# Patient Record
(demographics unavailable — no encounter records)

---

## 2024-10-16 NOTE — REVIEW OF SYSTEMS
[Shortness Of Breath] : shortness of breath [Cough] : cough [Diarrhea: Grade 0] : Diarrhea: Grade 0 [Negative] : Endocrine [FreeTextEntry6] : chronic

## 2024-10-16 NOTE — HISTORY OF PRESENT ILLNESS
[de-identified] : 66 y/o female current smoker ( 2 PPD x 20 yrs, now cut down to 6-7 ciggs / day few yrs) with newly diagnosed small cell lung cancer referred by Dr Kris Shepherd. She  presented with enlarging pulmonary nodules.   CT chest 8/13/24  ( [facility])  showed  enlarging   3.9 x 2.9. 2.6 cm RUL lung mass and new 5x 3.6 x 3.6 cm mass like density in RUL anteromedially  Navigational bronchoscopy on 09/05/2024. Path of RUL cryo biopsy revealed positive for malignant cells. Small cell carcinoma. Path of Level 7, 4R, 11R revealed negative for malignant cells. No epithelial lesion identified.   Chronic mild cough and SOB on exertion- - no significant change. Follows with pulmonologist Dr Katie Jauregui ( [place]- [phone]).  No weigth loss. Overall feels OK- " as usual".  Staging w/up :  PET 10/5/24  FDG avid lobulated right upper lobe mass with necrotic component measures 4.8 x 2.9 cm (image 70) and approximately 7.5 cm in vertical span with photopenic component and thick rind of activity showing SUV 18.3 compatible with recently biopsy-proven malignancy. There are additional FDG avid opacities in the anteromedial aspect of the right upper lobe (image 85) measuring 3.6 x 3.0 cm with up to SUV 9.6. These opacities are also present on the most recent CT chest measuring 4.5 x 2.5 cm and 7.9 cm in vertical span. IMPRESSION : Right upper lobe FDG avid lobulated mass with intense activity compatible with biopsy-proven malignancy. Nonavid 2.0 cm left adrenal nodule. Brain MRI pending  ( MRI was ordered but CT head non contrast done only )

## 2024-10-16 NOTE — ASSESSMENT
[FreeTextEntry1] : 68 y/o female  with COPD and  > 40 pack year smoking history  in  September 2024 diagnosed with small cell lung cancer involving RUL, negative mediastinal node sampling on navigational bronchoscopy. Her performance status is quite good. PET scan did not show distant metastases.  Brain MRI with gadolinium is pending.   Clinically she  has limited  disease small cell lung cancer and  options include surgical resection    followed by adjuvant chemotherapy (  if she is a surgical candidate from thoracic surgery stand point and has adequate pulmonary function/ cleared by pulmonary )  versus definitive chemoRT followed by IO consolidation for non surgical candidates. Although never compared directly in randomized studies- overall survival seems  better in patients treated with surgery and chemotherapy rather than definitive chemoradiation.  I will d/w Dr Jauregui  re : if surgical candidate from pulmonary stand point. If no surgery planned- treatment will be definitive chemoRT followed by IO. Cisplatin 75 mg/m2 D1 ( will split into 35 mg/m2 D1,2 - better tolerated)  Etoposide 100 mg/m2 D1, 2, 3  Every 21-28 days concurrent with RT ( can start chemotherapy first and RT can be aded with cycle 2) Followed by durvalumab [id number] mg q 28 days x one year.    PCP : Dr Glenna Armijo

## 2024-10-16 NOTE — GOALS
[Patient] : patient [Staff: _____] : staff - [unfilled] [FreeTextEntry1] : Gerardo Vaughn [FreeTextEntry2] : spouse [FreeTextEntry3] : [phone] [FreeTextEntry7] : Discussed Health Care Proxy (HCP) with patient and provided patient with HCP form. Patient expressed that her , Gerardo Vaughn, is her HCP and will bring in supporting documentation when she comes in for treatment in a few weeks.

## 2024-11-04 NOTE — REVIEW OF SYSTEMS
[Wheezing] : wheezing [Cough] : cough [SOB on Exertion] : shortness of breath during exertion [Anxiety] : anxiety [Negative] : Allergic/Immunologic

## 2024-11-04 NOTE — REASON FOR VISIT
[Consideration of Curative Therapy] : consideration of curative therapy for [Lung Cancer] : lung cancer [Friend] : friend

## 2024-11-04 NOTE — HISTORY OF PRESENT ILLNESS
[FreeTextEntry1] : The patient is a pleasant 67 year old female diagnosed with limited stage small cell lung cancer referred by Dr. Castrejon. She reports she was sent for a screening chest CT due to her smoking history ( 2 PPD x 20 yrs, now cut down to 2 cigs / day ). She also has COPD and chronic cough.  CT chest 8/13/24 ([facility]) showed enlarging 3.9 x 2.9. 2.6 cm RUL lung mass and new 5x 3.6 x 3.6 cm mass like density in RUL anteromedially.  Navigational bronchoscopy on 09/05/2024. Path of RUL biopsy revealed positive for malignant cells c/w small cell carcinoma. Path of Level 7, 4R, 11R revealed negative for malignant cells. No epithelial lesion identified.  CT brain w/o contrast 10/4/24 showed:  *  No evidence of acute intracranial hemorrhage, midline shift or CT evidence of acute territorial infarct. No area of abnormal enhancement. *  2.3 cm calcified lesion in the scalp at the level of the vertex may represent a sebaceous cyst. Clinical correlation suggested.  PET/CT ([facility]) 10/5/24 FDG avid lobulated right upper lobe mass with necrotic component measures 4.8 x 2.9 cm and approximately 7.5 cm in vertical span with photopenic component and thick rind of activity showing SUV 18.3 compatible with recently biopsy-proven malignancy. There are additional FDG avid opacities in the anteromedial aspect of the right upper lobe (image 85) measuring 3.6 x 3.0 cm with up to SUV 9.6. These opacities are also present on the most recent CT chest measuring 4.5 x 2.5 cm and 7.9 cm in vertical span. IMPRESSION : Right upper lobe FDG avid lobulated mass with intense activity compatible with biopsy-proven malignancy. Nonavid 2.0 cm left adrenal nodule.  Brain MRI 10/4/24 No evidence of metastases. No evidence of acute infarct, or hemorrhage.   There is no evidence of abnormal enhancement. Scattered T2/FLAIR hyperintense signal in the periventricular and pontine white matter, suggestive of mild to moderate chronic microvascular ischemic change.  She lives in  [place] with her . She has chronic mild cough and SOB on exertion- due to COPD, no significant change. Follows with pulmonologist Dr Katie Jauregui.  PFTs on 4/16/24 showed FEV 1 0.78 L and DLCO 46 % predicted. She does not use home O2 but was not cleared for surgery.  She began cycle 1 of chemotherapy last week with Cisplatin/Etopside on 11/1/24 and presents with her friend to discuss the role of radiation therapy in her care.

## 2024-11-04 NOTE — HISTORY OF PRESENT ILLNESS
[FreeTextEntry1] : The patient is a pleasant 67 year old female diagnosed with limited stage small cell lung cancer referred by Dr. Castrejon. She reports she was sent for a screening chest CT due to her smoking history ( 2 PPD x 20 yrs, now cut down to 2 cigs / day ). She also has COPD and chronic cough.  CT chest 8/13/24 ([facility]) showed enlarging 3.9 x 2.9. 2.6 cm RUL lung mass and new 5x 3.6 x 3.6 cm mass like density in RUL anteromedially.  Navigational bronchoscopy on 09/05/2024. Path of RUL biopsy revealed positive for malignant cells c/w small cell carcinoma. Path of Level 7, 4R, 11R revealed negative for malignant cells. No epithelial lesion identified.  CT brain w/o contrast 10/4/24 showed:  *  No evidence of acute intracranial hemorrhage, midline shift or CT evidence of acute territorial infarct. No area of abnormal enhancement. *  2.3 cm calcified lesion in the scalp at the level of the vertex may represent a sebaceous cyst. Clinical correlation suggested.  PET/CT ([facility]) 10/5/24 FDG avid lobulated right upper lobe mass with necrotic component measures 4.8 x 2.9 cm and approximately 7.5 cm in vertical span with photopenic component and thick rind of activity showing SUV 18.3 compatible with recently biopsy-proven malignancy. There are additional FDG avid opacities in the anteromedial aspect of the right upper lobe (image 85) measuring 3.6 x 3.0 cm with up to SUV 9.6. These opacities are also present on the most recent CT chest measuring 4.5 x 2.5 cm and 7.9 cm in vertical span. IMPRESSION : Right upper lobe FDG avid lobulated mass with intense activity compatible with biopsy-proven malignancy. Nonavid 2.0 cm left adrenal nodule.  Brain MRI 10/4/24 No evidence of metastases. No evidence of acute infarct, or hemorrhage.   There is no evidence of abnormal enhancement. Scattered T2/FLAIR hyperintense signal in the periventricular and pontine white matter, suggestive of mild to moderate chronic microvascular ischemic change.  She lives in  [place] with her . She has chronic mild cough and SOB on exertion- due to COPD, no significant change. Follows with pulmonologist Dr Katie Jauregui.  PFTs on 4/16/24 showed FEV 1 0.78 L and DLCO 46 % predicted. She does not use home O2 but was not cleared for surgery.  She began cycle 1 of chemotherapy last week with Cisplatin/Etopside on 11/1/24 and presents with her friend to discuss the role of radiation therapy in her care.

## 2024-11-04 NOTE — VITALS
[Maximal Pain Intensity: 0/10] : 0/10 [Date: ____________] : Patient's last distress assessment performed on [unfilled]. [2 - Distress Level] : Distress Level: 2 [Patient given social work contact information and resource sheet] : Patient was given social work contact information and resource sheet [80: Normal activity with effort; some signs or symptoms of disease.] : 80: Normal activity with effort; some signs or symptoms of disease.

## 2024-11-04 NOTE — DISEASE MANAGEMENT
[Clinical] : TNM Stage: c [II] : II [FreeTextEntry4] : limited stage [TTNM] : 2 [NTNM] : 0 [MTNM] : 0

## 2024-11-04 NOTE — PHYSICAL EXAM
[Outer Ear] : the ears and nose were normal in appearance [Both Tympanic Membranes Were Examined] : both tympanic membranes were normal [] : no respiratory distress [Heart Rate And Rhythm] : heart rate and rhythm were normal [Heart Sounds] : normal S1 and S2 [Cervical Lymph Nodes Enlarged Posterior Bilaterally] : posterior cervical [Supraclavicular Lymph Nodes Enlarged Bilaterally] : supraclavicular [No Focal Deficits] : no focal deficits [Normal] : oriented to person, place and time, the affect was normal, the mood was normal and not anxious [Oriented To Time, Place, And Person] : oriented to person, place, and time

## 2024-11-13 NOTE — ASSESSMENT
[FreeTextEntry1] : Patient is a 67 y.o. with COPD, with limited stage small cell lung cancer RUL, negative mediastinal node sampling on navigational bronchoscopy. Not a surgical candidate due to poor PFT's, else her performance status is quite good. 11/1/24 - Started on chemotherapy with CDDP/Etoposide.  Tolerated 1st cycle very well.  Has since been seen by Rad/onc - plan to start RT with C#2 - to go over ~ 6 1/2 weeks.    Start date set for 11/18/24.  To be followed by durvalumab [id number] mg q28 days x one year.   C#2 due 11/20/24.  PE 12/4/24.  C#3 due 12/11/24.  Will need weekly CBC's at this time.  C#4 due 1/1/25 - since Due on New Year's Day will need to clarify how to give for this week.  Will book additional appointments.   Dr. Glenna Armijo (PCP) Dr. Katie Jauregui (Pulm - [place]- [phone]) Dr. Yoselyn Taylor (Rad/Onc)

## 2024-11-13 NOTE — HISTORY OF PRESENT ILLNESS
[Disease: _____________________] : Disease: [unfilled] [T: ___] : T[unfilled] [AJCC Stage: ____] : AJCC Stage: [unfilled] [de-identified] : WILBER SORTO is a 67 y.o. F, current smoker (hx 2 PPD x 20 yrs, now cut down to 6-7 cigs/day few yrs), with a PMH significant for HTN and COPD, who we are following for a small cell lung cancer.  She presented with enlarging pulmonary nodules. Chronic mild cough and SOB on exertion - no significant change. Follows with pulmonologist Dr Katie Jauregui ( [place]- [phone]).  No weight loss. Overall feels OK - "as usual".  8/13/24 - CT Chest ([facility]) - Enlarging 3.9 x 2.9. 2.6 cm RUL lung mass and new 5 x 3.6 x 3.6 cm mass like density in RUL anteromedially. 9/5/24 - Navigational bronchoscopy - Path: RUL Cryo biopsy and RUL BAL positive for malignant cells - Small cell carcinoma. Path of Level 7, 4R, 11R negative for malignant cells. No epithelial lesion identified.  10/5/24 - PET/CT - 4.8 x 2.9 x approximately 7.5 cm in vertical span RUL mass with necrotic component, SUV 18.3, c/w recent biopsy-proven malignancy. There are additional FDG avid opacities in the anteromedial aspect of the RUL measuring 3.6 x 3.0 cm with up to SUV 9.6. These opacities are also present on the most recent CT chest measuring 4.5 x 2.5 cm and 7.9 cm in vertical span.  Nonavid 2.0 cm left adrenal nodule. 10/14/24 - Brain MRI - No evidence of metastases. No evidence of acute infarct, or hemorrhage.   PFTs on 4/16/24 showed FEV 1 0.78 L and DLCO 46 % predicted. She does not use home O2 but was not cleared for surgery.  11/1/24 - Started CDDP/Etoposide.   [de-identified] : Small cell [de-identified] : PD-L1 0% [de-identified] : Patient D15 C1 CDDP/Etoposide Saw RT - candidate for definitive chemoradiation. Had sim on 11/5/24.  Scheduled to start on 11/18/24.  States did very well with 1st chemo - had no N/V - did not need to take any nausea pills.   Had some constipation - took Miralax x 2.  Denies significant fatigue.  Denies any pains.  Main issue has been stress as she is having issues with her  - she feels he is not supportive.  Still smoking a few cigarettes a day.  Denies any changes in her family, medical, or social history since her last visit of 10/8/24.

## 2024-11-13 NOTE — REASON FOR VISIT
[Follow-Up Visit] : a follow-up [FreeTextEntry2] : Locally advanced small cell lung cancer - D15 C1 CDDP/Etoposide

## 2024-11-13 NOTE — PHYSICAL EXAM
[Normal] : full range of motion and no deformities appreciated [de-identified] : looks well  [de-identified] : anicteric [de-identified] : no c/c/e [de-identified] : no rashes

## 2024-11-13 NOTE — ASSESSMENT
[FreeTextEntry1] : Patient is a 67 y.o. with COPD, with limited stage small cell lung cancer RUL, negative mediastinal node sampling on navigational bronchoscopy. Not a surgical candidate due to poor PFT's, else her performance status is quite good. 11/1/24 - Started on chemotherapy with CDDP/Etoposide.  Tolerated 1st cycle very well.  Has since been seen by Rad/onc - plan to start RT with C#2 - to go over ~ 6 1/2 weeks.    Start date set for 11/18/24.  To be followed by durvalumab [id number] mg q28 days x one year.   C#2 due 11/20/24.  PE 12/4/24.  C#3 due 12/11/24.  Will need weekly CBC's at this time.  C#4 due 1/1/25 - since Due on New Year's Day will need to clarify how to give for this week.  Will book additional appointments.   Dr. Glenna Armijo (PCP) Dr. Katie Jauregui (Pulm - [place]- [phone]) Dr. Yoselyn Taylor (Rad/Onc)

## 2024-11-13 NOTE — PHYSICAL EXAM
[Normal] : full range of motion and no deformities appreciated [de-identified] : looks well  [de-identified] : anicteric [de-identified] : no c/c/e [de-identified] : no rashes

## 2024-11-13 NOTE — REVIEW OF SYSTEMS
[Shortness Of Breath] : shortness of breath [Cough] : cough [Diarrhea: Grade 0] : Diarrhea: Grade 0 [Patient Intake Form Reviewed] : Patient intake form was reviewed [Constipation] : constipation [Negative] : Allergic/Immunologic [FreeTextEntry6] : chronic

## 2024-11-13 NOTE — HISTORY OF PRESENT ILLNESS
[Disease: _____________________] : Disease: [unfilled] [T: ___] : T[unfilled] [AJCC Stage: ____] : AJCC Stage: [unfilled] [de-identified] : WILBER SORTO is a 67 y.o. F, current smoker (hx 2 PPD x 20 yrs, now cut down to 6-7 cigs/day few yrs), with a PMH significant for HTN and COPD, who we are following for a small cell lung cancer.  She presented with enlarging pulmonary nodules. Chronic mild cough and SOB on exertion - no significant change. Follows with pulmonologist Dr Katie Jauregui ( [place]- [phone]).  No weight loss. Overall feels OK - "as usual".  8/13/24 - CT Chest ([facility]) - Enlarging 3.9 x 2.9. 2.6 cm RUL lung mass and new 5 x 3.6 x 3.6 cm mass like density in RUL anteromedially. 9/5/24 - Navigational bronchoscopy - Path: RUL Cryo biopsy and RUL BAL positive for malignant cells - Small cell carcinoma. Path of Level 7, 4R, 11R negative for malignant cells. No epithelial lesion identified.  10/5/24 - PET/CT - 4.8 x 2.9 x approximately 7.5 cm in vertical span RUL mass with necrotic component, SUV 18.3, c/w recent biopsy-proven malignancy. There are additional FDG avid opacities in the anteromedial aspect of the RUL measuring 3.6 x 3.0 cm with up to SUV 9.6. These opacities are also present on the most recent CT chest measuring 4.5 x 2.5 cm and 7.9 cm in vertical span.  Nonavid 2.0 cm left adrenal nodule. 10/14/24 - Brain MRI - No evidence of metastases. No evidence of acute infarct, or hemorrhage.   PFTs on 4/16/24 showed FEV 1 0.78 L and DLCO 46 % predicted. She does not use home O2 but was not cleared for surgery.  11/1/24 - Started CDDP/Etoposide.   [de-identified] : Small cell [de-identified] : PD-L1 0% [de-identified] : Patient D15 C1 CDDP/Etoposide Saw RT - candidate for definitive chemoradiation. Had sim on 11/5/24.  Scheduled to start on 11/18/24.  States did very well with 1st chemo - had no N/V - did not need to take any nausea pills.   Had some constipation - took Miralax x 2.  Denies significant fatigue.  Denies any pains.  Main issue has been stress as she is having issues with her  - she feels he is not supportive.  Still smoking a few cigarettes a day.  Denies any changes in her family, medical, or social history since her last visit of 10/8/24.

## 2024-11-19 NOTE — DISEASE MANAGEMENT
[Clinical] : TNM Stage: c [II] : II [FreeTextEntry4] : limited stage [NTNM] : 0 [TTNM] : 2 [MTNM] : 0 [de-identified] : 400 [de-identified] : 7099 [de-identified] : Right lung

## 2024-11-19 NOTE — DISEASE MANAGEMENT
[Clinical] : TNM Stage: c [II] : II [FreeTextEntry4] : limited stage [TTNM] : 2 [NTNM] : 0 [MTNM] : 0 [de-identified] : 400 [de-identified] : 0229 [de-identified] : Right lung

## 2024-11-19 NOTE — HISTORY OF PRESENT ILLNESS
[FreeTextEntry1] : The patient is a pleasant 68 year old female diagnosed with limited stage small cell lung cancer referred by Dr. Castrejon. She reports she was sent for a screening chest CT due to her smoking history ( 2 PPD x 20 yrs, now cut down to 2 cigs / day ). She also has COPD and chronic cough.  CT chest 8/13/24 ([facility]) showed enlarging 3.9 x 2.9. 2.6 cm RUL lung mass and new 5x 3.6 x 3.6 cm mass like density in RUL anteromedially.  Navigational bronchoscopy on 09/05/2024. Path of RUL biopsy revealed positive for malignant cells c/w small cell carcinoma. Path of Level 7, 4R, 11R revealed negative for malignant cells. No epithelial lesion identified.  CT brain w/o contrast 10/4/24 showed:  *  No evidence of acute intracranial hemorrhage, midline shift or CT evidence of acute territorial infarct. No area of abnormal enhancement. *  2.3 cm calcified lesion in the scalp at the level of the vertex may represent a sebaceous cyst. Clinical correlation suggested.  PET/CT ([facility]) 10/5/24 FDG avid lobulated right upper lobe mass with necrotic component measures 4.8 x 2.9 cm and approximately 7.5 cm in vertical span with photopenic component and thick rind of activity showing SUV 18.3 compatible with recently biopsy-proven malignancy. There are additional FDG avid opacities in the anteromedial aspect of the right upper lobe (image 85) measuring 3.6 x 3.0 cm with up to SUV 9.6. These opacities are also present on the most recent CT chest measuring 4.5 x 2.5 cm and 7.9 cm in vertical span. IMPRESSION : Right upper lobe FDG avid lobulated mass with intense activity compatible with biopsy-proven malignancy. Nonavid 2.0 cm left adrenal nodule.  Brain MRI 10/4/24 No evidence of metastases. No evidence of acute infarct, or hemorrhage.   There is no evidence of abnormal enhancement. Scattered T2/FLAIR hyperintense signal in the periventricular and pontine white matter, suggestive of mild to moderate chronic microvascular ischemic change.  She lives in  [place] with her . She has chronic mild cough and SOB on exertion- due to COPD, no significant change. Follows with pulmonologist Dr Katie Jauregui.  PFTs on 4/16/24 showed FEV 1 0.78 L and DLCO 46 % predicted. She does not use home O2 but was not cleared for surgery.  She began cycle 1 of chemotherapy last week with Cisplatin/Etopside on 11/1/24 and presents with her friend to discuss the role of radiation therapy in her care.  11/19/24 Patient seen for OTV fx 2/33. Feels well. 2nd cycle chemo this week.

## 2024-11-26 NOTE — DISEASE MANAGEMENT
[Clinical] : TNM Stage: c [II] : II [FreeTextEntry4] : limited stage [TTNM] : 2 [NTNM] : 0 [MTNM] : 0 [de-identified] : 3401 [de-identified] : 0303 [de-identified] : Right lung

## 2024-11-26 NOTE — REASON FOR VISIT
[Routine On-Treatment] : a routine on-treatment visit for [Lung Cancer] : lung cancer [Friend] : friend

## 2024-11-26 NOTE — HISTORY OF PRESENT ILLNESS
[FreeTextEntry1] : The patient is a pleasant 68 year old female diagnosed with limited stage small cell lung cancer referred by Dr. Castrejon. She reports she was sent for a screening chest CT due to her smoking history ( 2 PPD x 20 yrs, now cut down to 2 cigs / day ). She also has COPD and chronic cough.  CT chest 8/13/24 ([facility]) showed enlarging 3.9 x 2.9. 2.6 cm RUL lung mass and new 5x 3.6 x 3.6 cm mass like density in RUL anteromedially.  Navigational bronchoscopy on 09/05/2024. Path of RUL biopsy revealed positive for malignant cells c/w small cell carcinoma. Path of Level 7, 4R, 11R revealed negative for malignant cells. No epithelial lesion identified.  CT brain w/o contrast 10/4/24 showed:  *  No evidence of acute intracranial hemorrhage, midline shift or CT evidence of acute territorial infarct. No area of abnormal enhancement. *  2.3 cm calcified lesion in the scalp at the level of the vertex may represent a sebaceous cyst. Clinical correlation suggested.  PET/CT ([facility]) 10/5/24 FDG avid lobulated right upper lobe mass with necrotic component measures 4.8 x 2.9 cm and approximately 7.5 cm in vertical span with photopenic component and thick rind of activity showing SUV 18.3 compatible with recently biopsy-proven malignancy. There are additional FDG avid opacities in the anteromedial aspect of the right upper lobe (image 85) measuring 3.6 x 3.0 cm with up to SUV 9.6. These opacities are also present on the most recent CT chest measuring 4.5 x 2.5 cm and 7.9 cm in vertical span. IMPRESSION : Right upper lobe FDG avid lobulated mass with intense activity compatible with biopsy-proven malignancy. Nonavid 2.0 cm left adrenal nodule.  Brain MRI 10/4/24 No evidence of metastases. No evidence of acute infarct, or hemorrhage.   There is no evidence of abnormal enhancement. Scattered T2/FLAIR hyperintense signal in the periventricular and pontine white matter, suggestive of mild to moderate chronic microvascular ischemic change.  She lives in  [place] with her . She has chronic mild cough and SOB on exertion- due to COPD, no significant change. Follows with pulmonologist Dr Katie Jauregui.  PFTs on 4/16/24 showed FEV 1 0.78 L and DLCO 46 % predicted. She does not use home O2 but was not cleared for surgery.  She began cycle 1 of chemotherapy last week with Cisplatin/Etopside on 11/1/24 and presents with her friend to discuss the role of radiation therapy in her care.  11/19/24 Patient seen for OTV fx 2/33. Feels well. 2nd cycle chemo this week.  11/26/24. Patient presents for OTV fx 8/33. Feels well. No fatigue. Upset about alopecia.

## 2024-12-04 NOTE — DISEASE MANAGEMENT
[Clinical] : TNM Stage: c [II] : II [FreeTextEntry4] : limited stage [TTNM] : 2 [NTNM] : 0 [MTNM] : 0 [de-identified] : 4695 [de-identified] : 2133 [de-identified] : Right lung

## 2024-12-04 NOTE — HISTORY OF PRESENT ILLNESS
[FreeTextEntry1] : The patient is a pleasant 68 year old female diagnosed with limited stage small cell lung cancer referred by Dr. Castrejon. She reports she was sent for a screening chest CT due to her smoking history ( 2 PPD x 20 yrs, now cut down to 2 cigs / day ). She also has COPD and chronic cough.  CT chest 8/13/24 ([facility]) showed enlarging 3.9 x 2.9. 2.6 cm RUL lung mass and new 5x 3.6 x 3.6 cm mass like density in RUL anteromedially.  Navigational bronchoscopy on 09/05/2024. Path of RUL biopsy revealed positive for malignant cells c/w small cell carcinoma. Path of Level 7, 4R, 11R revealed negative for malignant cells. No epithelial lesion identified.  CT brain w/o contrast 10/4/24 showed:  *  No evidence of acute intracranial hemorrhage, midline shift or CT evidence of acute territorial infarct. No area of abnormal enhancement. *  2.3 cm calcified lesion in the scalp at the level of the vertex may represent a sebaceous cyst. Clinical correlation suggested.  PET/CT ([facility]) 10/5/24 FDG avid lobulated right upper lobe mass with necrotic component measures 4.8 x 2.9 cm and approximately 7.5 cm in vertical span with photopenic component and thick rind of activity showing SUV 18.3 compatible with recently biopsy-proven malignancy. There are additional FDG avid opacities in the anteromedial aspect of the right upper lobe (image 85) measuring 3.6 x 3.0 cm with up to SUV 9.6. These opacities are also present on the most recent CT chest measuring 4.5 x 2.5 cm and 7.9 cm in vertical span. IMPRESSION : Right upper lobe FDG avid lobulated mass with intense activity compatible with biopsy-proven malignancy. Nonavid 2.0 cm left adrenal nodule.  Brain MRI 10/4/24 No evidence of metastases. No evidence of acute infarct, or hemorrhage.   There is no evidence of abnormal enhancement. Scattered T2/FLAIR hyperintense signal in the periventricular and pontine white matter, suggestive of mild to moderate chronic microvascular ischemic change.  She lives in  [place] with her . She has chronic mild cough and SOB on exertion- due to COPD, no significant change. Follows with pulmonologist Dr Katie Jauregui.  PFTs on 4/16/24 showed FEV 1 0.78 L and DLCO 46 % predicted. She does not use home O2 but was not cleared for surgery.  She began cycle 1 of chemotherapy last week with Cisplatin/Etopside on 11/1/24 and presents with her friend to discuss the role of radiation therapy in her care.  11/19/24 Patient seen for OTV fx 2/33. Feels well. 2nd cycle chemo this week.  11/26/24. Patient presents for OTV fx 8/33. Feels well. No fatigue. Upset about alopecia.   12/4/24 Patient seen for OTV, fx 11/33. Fatigue and bothered by alopecia.

## 2024-12-10 NOTE — HISTORY OF PRESENT ILLNESS
[FreeTextEntry1] : The patient is a pleasant 68 year old female diagnosed with limited stage small cell lung cancer referred by Dr. Castrejon. She reports she was sent for a screening chest CT due to her smoking history ( 2 PPD x 20 yrs, now cut down to 2 cigs / day ). She also has COPD and chronic cough.  CT chest 8/13/24 ([facility]) showed enlarging 3.9 x 2.9. 2.6 cm RUL lung mass and new 5x 3.6 x 3.6 cm mass like density in RUL anteromedially.  Navigational bronchoscopy on 09/05/2024. Path of RUL biopsy revealed positive for malignant cells c/w small cell carcinoma. Path of Level 7, 4R, 11R revealed negative for malignant cells. No epithelial lesion identified.  CT brain w/o contrast 10/4/24 showed:  *  No evidence of acute intracranial hemorrhage, midline shift or CT evidence of acute territorial infarct. No area of abnormal enhancement. *  2.3 cm calcified lesion in the scalp at the level of the vertex may represent a sebaceous cyst. Clinical correlation suggested.  PET/CT ([facility]) 10/5/24 FDG avid lobulated right upper lobe mass with necrotic component measures 4.8 x 2.9 cm and approximately 7.5 cm in vertical span with photopenic component and thick rind of activity showing SUV 18.3 compatible with recently biopsy-proven malignancy. There are additional FDG avid opacities in the anteromedial aspect of the right upper lobe (image 85) measuring 3.6 x 3.0 cm with up to SUV 9.6. These opacities are also present on the most recent CT chest measuring 4.5 x 2.5 cm and 7.9 cm in vertical span. IMPRESSION : Right upper lobe FDG avid lobulated mass with intense activity compatible with biopsy-proven malignancy. Nonavid 2.0 cm left adrenal nodule.  Brain MRI 10/4/24 No evidence of metastases. No evidence of acute infarct, or hemorrhage.   There is no evidence of abnormal enhancement. Scattered T2/FLAIR hyperintense signal in the periventricular and pontine white matter, suggestive of mild to moderate chronic microvascular ischemic change.  She lives in  [place] with her . She has chronic mild cough and SOB on exertion- due to COPD, no significant change. Follows with pulmonologist Dr Katie Jauregui.  PFTs on 4/16/24 showed FEV 1 0.78 L and DLCO 46 % predicted. She does not use home O2 but was not cleared for surgery.  She began cycle 1 of chemotherapy last week with Cisplatin/Etopside on 11/1/24 and presents with her friend to discuss the role of radiation therapy in her care.  11/19/24 Patient seen for OTV fx 2/33. Feels well. 2nd cycle chemo this week.  11/26/24. Patient presents for OTV fx 8/33. Feels well. No fatigue. Upset about alopecia.   12/4/24 Patient seen for OTV, fx 11/33. Fatigue and bothered by alopecia.  12/10/24 Patient seen for OTV, fx 15/33. Feels well. NO complaints.

## 2024-12-10 NOTE — DISEASE MANAGEMENT
[Clinical] : TNM Stage: c [II] : II [FreeTextEntry4] : limited stage [TTNM] : 2 [NTNM] : 0 [MTNM] : 0 [de-identified] : 2100 [de-identified] : 8304 [de-identified] : Right lung

## 2024-12-13 NOTE — REASON FOR VISIT
[Follow-Up Visit] : a follow-up [Friend] : friend [FreeTextEntry2] : Locally advanced small cell lung cancer - D15 C2 CDDP/Etoposide

## 2024-12-13 NOTE — REVIEW OF SYSTEMS
[Patient Intake Form Reviewed] : Patient intake form was reviewed [Shortness Of Breath] : shortness of breath [Cough] : cough [Constipation] : constipation [Negative] : Allergic/Immunologic [FreeTextEntry6] : chronic

## 2024-12-13 NOTE — HISTORY OF PRESENT ILLNESS
[Disease: _____________________] : Disease: [unfilled] [T: ___] : T[unfilled] [AJCC Stage: ____] : AJCC Stage: [unfilled] [de-identified] : WILBER SORTO is a 68 y.o. F, current smoker (hx 2 PPD x 20 yrs, now cut down to 6-7 cigs/day few yrs), with a PMH significant for HTN and COPD, who we are following for a small cell lung cancer.  She presented with enlarging pulmonary nodules. Chronic mild cough and SOB on exertion - no significant change. Follows with pulmonologist Dr Katie Jauregui ( [place]- [phone]).  No weight loss. Overall feels OK - "as usual".  8/13/24 - CT Chest ([facility]) - Enlarging 3.9 x 2.9. 2.6 cm RUL lung mass and new 5 x 3.6 x 3.6 cm mass like density in RUL anteromedially. 9/5/24 - Navigational bronchoscopy - Path: RUL Cryo biopsy and RUL BAL positive for malignant cells - Small cell carcinoma. Path of Level 7, 4R, 11R negative for malignant cells. No epithelial lesion identified.  10/5/24 - PET/CT - 4.8 x 2.9 x approximately 7.5 cm in vertical span RUL mass with necrotic component, SUV 18.3, c/w recent biopsy-proven malignancy. There are additional FDG avid opacities in the anteromedial aspect of the RUL measuring 3.6 x 3.0 cm with up to SUV 9.6. These opacities are also present on the most recent CT chest measuring 4.5 x 2.5 cm and 7.9 cm in vertical span.  Nonavid 2.0 cm left adrenal nodule. 10/14/24 - Brain MRI - No evidence of metastases. No evidence of acute infarct, or hemorrhage.   PFTs on 4/16/24 showed FEV 1 0.78 L and DLCO 46 % predicted. She does not use home O2 but was not cleared for surgery.  11/1/24 - Started CDDP/Etoposide. 11/18/24 - Started XRT prior to C#2.  [de-identified] : Small cell [de-identified] : PD-L1 0% [de-identified] : Patient D15 C2 CDDP/Etoposide 11/18/24 - Started RT.  Very anxious today - ready to walk out stating "This is my 3rd doctor appointment today and I just want to go home." Patient states she is tolerating RT and chemo well.  Nothing new since last visit.   Denies any changes in her family, medical, or social history since her last visit of 11/13/24.

## 2024-12-13 NOTE — ASSESSMENT
[FreeTextEntry1] : Patient is a 68 y.o. with COPD, with limited stage small cell lung cancer RUL, negative mediastinal node sampling on navigational bronchoscopy. Not a surgical candidate due to poor PFT's, else her performance status is quite good.  11/1/24 - Started on chemotherapy with CDDP/Etoposide.  11/18/24 - Started XRT prior to C#2 - to go over ~ 6 1/2 weeks.    Continues to tolerate chemo and RT very well.   Plan -  C#3 due 12/11/24.  Will need weekly CBC's at this time - sched 12/18/24.  Has PE 12/23/24 at 5:00.  Patient's RT appt is earlier in the day.  She was instructed to come for labs after and we can then make this appointment a telehealth visit.   C#4 due 1/1/25 - since Due on New Year's Day will give 12/31/24, then 1/2/25 and 1/3/25.  After 4 cycles plan is to start on durvalumab [id number] mg q28 days x one year.   Dr. Glenna Armijo (PCP) Dr. Katie Jauregui (Pulm - [place]- [phone]) Dr. Yoselyn Taylor (Rad/Onc)

## 2024-12-13 NOTE — PHYSICAL EXAM
[Normal] : affect appropriate [de-identified] : looks well  [de-identified] : anicteric [de-identified] : no c/c/e [de-identified] : no rashes

## 2024-12-17 NOTE — DISEASE MANAGEMENT
[Clinical] : TNM Stage: c [II] : II [FreeTextEntry4] : limited stage [TTNM] : 2 [NTNM] : 0 [MTNM] : 0 [de-identified] : 3202 [de-identified] : 3571 [de-identified] : Right lung

## 2024-12-17 NOTE — HISTORY OF PRESENT ILLNESS
[FreeTextEntry1] : The patient is a pleasant 68 year old female diagnosed with limited stage small cell lung cancer referred by Dr. Castrejon. She reports she was sent for a screening chest CT due to her smoking history ( 2 PPD x 20 yrs, now cut down to 2 cigs / day ). She also has COPD and chronic cough.  CT chest 8/13/24 ([facility]) showed enlarging 3.9 x 2.9. 2.6 cm RUL lung mass and new 5x 3.6 x 3.6 cm mass like density in RUL anteromedially.  Navigational bronchoscopy on 09/05/2024. Path of RUL biopsy revealed positive for malignant cells c/w small cell carcinoma. Path of Level 7, 4R, 11R revealed negative for malignant cells. No epithelial lesion identified.  CT brain w/o contrast 10/4/24 showed:  *  No evidence of acute intracranial hemorrhage, midline shift or CT evidence of acute territorial infarct. No area of abnormal enhancement. *  2.3 cm calcified lesion in the scalp at the level of the vertex may represent a sebaceous cyst. Clinical correlation suggested.  PET/CT ([facility]) 10/5/24 FDG avid lobulated right upper lobe mass with necrotic component measures 4.8 x 2.9 cm and approximately 7.5 cm in vertical span with photopenic component and thick rind of activity showing SUV 18.3 compatible with recently biopsy-proven malignancy. There are additional FDG avid opacities in the anteromedial aspect of the right upper lobe (image 85) measuring 3.6 x 3.0 cm with up to SUV 9.6. These opacities are also present on the most recent CT chest measuring 4.5 x 2.5 cm and 7.9 cm in vertical span. IMPRESSION : Right upper lobe FDG avid lobulated mass with intense activity compatible with biopsy-proven malignancy. Nonavid 2.0 cm left adrenal nodule.  Brain MRI 10/4/24 No evidence of metastases. No evidence of acute infarct, or hemorrhage.   There is no evidence of abnormal enhancement. Scattered T2/FLAIR hyperintense signal in the periventricular and pontine white matter, suggestive of mild to moderate chronic microvascular ischemic change.  She lives in  [place] with her . She has chronic mild cough and SOB on exertion- due to COPD, no significant change. Follows with pulmonologist Dr Katie Jauregui.  PFTs on 4/16/24 showed FEV 1 0.78 L and DLCO 46 % predicted. She does not use home O2 but was not cleared for surgery.  She began cycle 1 of chemotherapy last week with Cisplatin/Etopside on 11/1/24 and presents with her friend to discuss the role of radiation therapy in her care.  11/19/24 Patient seen for OTV fx 2/33. Feels well. 2nd cycle chemo this week.  11/26/24. Patient presents for OTV fx 8/33. Feels well. No fatigue. Upset about alopecia.   12/4/24 Patient seen for OTV, fx 11/33. Fatigue and bothered by alopecia.  12/10/24 Patient seen for OTV, fx 15/33. Feels well. NO complaints.  12/17/24 Patient is seen for OTV, Fx 20/33. Only complaint is constipation. Tried stool softerner without success. Advised to take Miralax daily.

## 2024-12-22 NOTE — HISTORY OF PRESENT ILLNESS
[Disease: _____________________] : Disease: [unfilled] [T: ___] : T[unfilled] [AJCC Stage: ____] : AJCC Stage: [unfilled] [de-identified] : WILBER SORTO is a 68 y.o. F, current smoker (hx 2 PPD x 20 yrs, now cut down to 6-7 cigs/day few yrs), with a PMH significant for HTN and COPD, who we are following for a small cell lung cancer.  She presented with enlarging pulmonary nodules. Chronic mild cough and SOB on exertion - no significant change. Follows with pulmonologist Dr Katie Jauregui ( [place]- [phone]).  No weight loss. Overall feels OK - "as usual".  8/13/24 - CT Chest ([facility]) - Enlarging 3.9 x 2.9. 2.6 cm RUL lung mass and new 5 x 3.6 x 3.6 cm mass like density in RUL anteromedially. 9/5/24 - Navigational bronchoscopy - Path: RUL Cryo biopsy and RUL BAL positive for malignant cells - Small cell carcinoma. Path of Level 7, 4R, 11R negative for malignant cells. No epithelial lesion identified.  10/5/24 - PET/CT - 4.8 x 2.9 x approximately 7.5 cm in vertical span RUL mass with necrotic component, SUV 18.3, c/w recent biopsy-proven malignancy. There are additional FDG avid opacities in the anteromedial aspect of the RUL measuring 3.6 x 3.0 cm with up to SUV 9.6. These opacities are also present on the most recent CT chest measuring 4.5 x 2.5 cm and 7.9 cm in vertical span.  Nonavid 2.0 cm left adrenal nodule. 10/14/24 - Brain MRI - No evidence of metastases. No evidence of acute infarct, or hemorrhage.   PFTs on 4/16/24 showed FEV 1 0.78 L and DLCO 46 % predicted. She does not use home O2 but was not cleared for surgery.  11/1/24 - Started CDDP/Etoposide. 11/18/24 - Started XRT prior to C#2.  [de-identified] : Small cell [de-identified] : PD-L1 0% [de-identified] : Cycle 3 cis etoposide 12/18/24 11/18/24 - Started RT.

## 2024-12-22 NOTE — REASON FOR VISIT
[Home] : at home, [unfilled] , at the time of the visit. [Medical Office: ([facility])___] : at the medical office located in  [Verbal consent obtained from patient] : the patient, [unfilled] [Follow-Up Visit] : a follow-up [Friend] : friend [FreeTextEntry2] : Locally advanced small cell lung cancer on

## 2024-12-22 NOTE — ASSESSMENT
[FreeTextEntry1] : Patient is a 68 y.o. with COPD, with limited stage small cell lung cancer RUL, negative mediastinal node sampling on navigational bronchoscopy. Not a surgical candidate due to poor PFT's, else her performance status is quite good.  11/1/24 - Started on chemotherapy with CDDP/Etoposide.  11/18/24 - Started XRT prior to C#2 - to go over ~ 6 1/2 weeks.    Continues to tolerate chemo and RT very well.   Plan -  C#3 due 12/11/24.  Will need weekly CBC's at this time - sched 12/18/24.  Has PE 12/23/24 at 5:00.  Patient's RT appt is earlier in the day.  She was instructed to come for labs after and we can then make this appointment a telehealth visit.   C#4 due 1/1/25 - since Due on New Year's Day will give 12/31/24, then 1/2/25 and 1/3/25.  After 4 cycles plan is to start on durvalumab [id number] mg q28 days x one year.   Dr. Glenna Armijo (PCP) Dr. Katie Jauregui (Pulm - [place]- [phone]) Dr. Yoselyn Taylor (Rad/Onc)

## 2024-12-23 NOTE — ASSESSMENT
[FreeTextEntry1] : 69 y/o female with COPD, with limited stage small cell lung cancer RUL, negative mediastinal node sampling on navigational bronchoscopy. Not a surgical candidate due to poor PFT's, else her performance status is quite good.  11/1/24 - Started on chemotherapy with CDDP/Etoposide.  11/18/24 - Started XRT prior to C#2 - to go over ~ 6 1/2 weeks.    Continues to tolerate chemo and RT very well.   Last chemotherapy cycle 4/4 will be due 12/31. Neutropenia due to chemo- should improve by next week. Mild rash- 2/2  RT- has topical creams per Rad Onc.  RT will finish ~ Jan 6. Plan- CT chest with contrast early Feb. If stable / improved- start immunotherapy consolidation- durvalumab  in Feb   (durvalumab [id number] mg q28 days x one year)   Dr. Glenna Armijo (PCP) Dr. Katie Jauregui (Pulm - [place]- [phone])

## 2024-12-23 NOTE — PHYSICAL EXAM
[Restricted in physically strenuous activity but ambulatory and able to carry out work of a light or sedentary nature] : Status 1- Restricted in physically strenuous activity but ambulatory and able to carry out work of a light or sedentary nature, e.g., light house work, office work [Normal] : affect appropriate [de-identified] : looks well  [de-identified] : no mucositis [de-identified] : faint dry macular rash upepr torso and back

## 2024-12-23 NOTE — HISTORY OF PRESENT ILLNESS
[Disease: _____________________] : Disease: [unfilled] [T: ___] : T[unfilled] [AJCC Stage: ____] : AJCC Stage: [unfilled] [de-identified] : WILBER SORTO is a 68 y.o. F, current smoker (hx 2 PPD x 20 yrs, now cut down to 6-7 cigs/day few yrs), with a PMH significant for HTN and COPD, who we are following for a small cell lung cancer.  She presented with enlarging pulmonary nodules. Chronic mild cough and SOB on exertion - no significant change. Follows with pulmonologist Dr Katie Jauregui ( [place]- [phone]).  No weight loss. Overall feels OK - "as usual".  8/13/24 - CT Chest ([facility]) - Enlarging 3.9 x 2.9. 2.6 cm RUL lung mass and new 5 x 3.6 x 3.6 cm mass like density in RUL anteromedially. 9/5/24 - Navigational bronchoscopy - Path: RUL Cryo biopsy and RUL BAL positive for malignant cells - Small cell carcinoma. Path of Level 7, 4R, 11R negative for malignant cells. No epithelial lesion identified.  10/5/24 - PET/CT - 4.8 x 2.9 x approximately 7.5 cm in vertical span RUL mass with necrotic component, SUV 18.3, c/w recent biopsy-proven malignancy. There are additional FDG avid opacities in the anteromedial aspect of the RUL measuring 3.6 x 3.0 cm with up to SUV 9.6. These opacities are also present on the most recent CT chest measuring 4.5 x 2.5 cm and 7.9 cm in vertical span.  Nonavid 2.0 cm left adrenal nodule. 10/14/24 - Brain MRI - No evidence of metastases. No evidence of acute infarct, or hemorrhage.   PFTs on 4/16/24 showed FEV 1 0.78 L and DLCO 46 % predicted. She does not use home O2 but was not cleared for surgery.  11/1/24 - Started CDDP/Etoposide. 11/18/24 - Started XRT prior to C#2.  [de-identified] : Small cell [de-identified] : PD-L1 0% [de-identified] : Cycle 3 cis etoposide 12/18/24  Tolerating treatment quite well. No n/v No GI c/o Weight stable.  No neuropathy Energy is not bad. Noticed rash upper chest/ back, has  cream Rx by Rad Onc

## 2024-12-24 NOTE — HISTORY OF PRESENT ILLNESS
[FreeTextEntry1] : The patient is a pleasant 68 year old female diagnosed with limited stage small cell lung cancer referred by Dr. Castrejon. She reports she was sent for a screening chest CT due to her smoking history ( 2 PPD x 20 yrs, now cut down to 2 cigs / day ). She also has COPD and chronic cough.  CT chest 8/13/24 ([facility]) showed enlarging 3.9 x 2.9. 2.6 cm RUL lung mass and new 5x 3.6 x 3.6 cm mass like density in RUL anteromedially.  Navigational bronchoscopy on 09/05/2024. Path of RUL biopsy revealed positive for malignant cells c/w small cell carcinoma. Path of Level 7, 4R, 11R revealed negative for malignant cells. No epithelial lesion identified.  CT brain w/o contrast 10/4/24 showed:  *  No evidence of acute intracranial hemorrhage, midline shift or CT evidence of acute territorial infarct. No area of abnormal enhancement. *  2.3 cm calcified lesion in the scalp at the level of the vertex may represent a sebaceous cyst. Clinical correlation suggested.  PET/CT ([facility]) 10/5/24 FDG avid lobulated right upper lobe mass with necrotic component measures 4.8 x 2.9 cm and approximately 7.5 cm in vertical span with photopenic component and thick rind of activity showing SUV 18.3 compatible with recently biopsy-proven malignancy. There are additional FDG avid opacities in the anteromedial aspect of the right upper lobe (image 85) measuring 3.6 x 3.0 cm with up to SUV 9.6. These opacities are also present on the most recent CT chest measuring 4.5 x 2.5 cm and 7.9 cm in vertical span. IMPRESSION : Right upper lobe FDG avid lobulated mass with intense activity compatible with biopsy-proven malignancy. Nonavid 2.0 cm left adrenal nodule.  Brain MRI 10/4/24 No evidence of metastases. No evidence of acute infarct, or hemorrhage.   There is no evidence of abnormal enhancement. Scattered T2/FLAIR hyperintense signal in the periventricular and pontine white matter, suggestive of mild to moderate chronic microvascular ischemic change.  She lives in  [place] with her . She has chronic mild cough and SOB on exertion- due to COPD, no significant change. Follows with pulmonologist Dr Katie Jauregui.  PFTs on 4/16/24 showed FEV 1 0.78 L and DLCO 46 % predicted. She does not use home O2 but was not cleared for surgery.  She began cycle 1 of chemotherapy last week with Cisplatin/Etopside on 11/1/24 and presents with her friend to discuss the role of radiation therapy in her care.  11/19/24 Patient seen for OTV fx 2/33. Feels well. 2nd cycle chemo this week.  11/26/24. Patient presents for OTV fx 8/33. Feels well. No fatigue. Upset about alopecia.   12/4/24 Patient seen for OTV, fx 11/33. Fatigue and bothered by alopecia.  12/10/24 Patient seen for OTV, fx 15/33. Feels well. NO complaints.  12/17/24 Patient is seen for OTV, Fx 20/33. Only complaint is constipation. Tried stool softerner without success. Advised to take Miralax daily.  12/24/24 Patient presents for OTV, fx 25/33  Complains of  pruritic rash on upper anterior chest.  Otherwise well.

## 2024-12-24 NOTE — HISTORY OF PRESENT ILLNESS
[FreeTextEntry1] : The patient is a pleasant 68 year old female diagnosed with limited stage small cell lung cancer referred by Dr. Castrejon. She reports she was sent for a screening chest CT due to her smoking history ( 2 PPD x 20 yrs, now cut down to 2 cigs / day ). She also has COPD and chronic cough.  CT chest 8/13/24 ([facility]) showed enlarging 3.9 x 2.9. 2.6 cm RUL lung mass and new 5x 3.6 x 3.6 cm mass like density in RUL anteromedially.  Navigational bronchoscopy on 09/05/2024. Path of RUL biopsy revealed positive for malignant cells c/w small cell carcinoma. Path of Level 7, 4R, 11R revealed negative for malignant cells. No epithelial lesion identified.  CT brain w/o contrast 10/4/24 showed:  *  No evidence of acute intracranial hemorrhage, midline shift or CT evidence of acute territorial infarct. No area of abnormal enhancement. *  2.3 cm calcified lesion in the scalp at the level of the vertex may represent a sebaceous cyst. Clinical correlation suggested.  PET/CT ([facility]) 10/5/24 FDG avid lobulated right upper lobe mass with necrotic component measures 4.8 x 2.9 cm and approximately 7.5 cm in vertical span with photopenic component and thick rind of activity showing SUV 18.3 compatible with recently biopsy-proven malignancy. There are additional FDG avid opacities in the anteromedial aspect of the right upper lobe (image 85) measuring 3.6 x 3.0 cm with up to SUV 9.6. These opacities are also present on the most recent CT chest measuring 4.5 x 2.5 cm and 7.9 cm in vertical span. IMPRESSION : Right upper lobe FDG avid lobulated mass with intense activity compatible with biopsy-proven malignancy. Nonavid 2.0 cm left adrenal nodule.  Brain MRI 10/4/24 No evidence of metastases. No evidence of acute infarct, or hemorrhage.   There is no evidence of abnormal enhancement. Scattered T2/FLAIR hyperintense signal in the periventricular and pontine white matter, suggestive of mild to moderate chronic microvascular ischemic change.  She lives in  [place] with her . She has chronic mild cough and SOB on exertion- due to COPD, no significant change. Follows with pulmonologist Dr Katie Jauregui.  PFTs on 4/16/24 showed FEV 1 0.78 L and DLCO 46 % predicted. She does not use home O2 but was not cleared for surgery.  She began cycle 1 of chemotherapy last week with Cisplatin/Etopside on 11/1/24 and presents with her friend to discuss the role of radiation therapy in her care.  11/19/24 Patient seen for OTV fx 2/33. Feels well. 2nd cycle chemo this week.  11/26/24. Patient presents for OTV fx 8/33. Feels well. No fatigue. Upset about alopecia.   12/4/24 Patient seen for OTV, fx 11/33. Fatigue and bothered by alopecia.  12/10/24 Patient seen for OTV, fx 15/33. Feels well. NO complaints.  12/17/24 Patient is seen for OTV, Fx 20/33. Only complaint is constipation. Tried stool softerner without success. Advised to take Miralax daily.  12/24/24 Patient presents for OTV, fx 25/33  Complains of  pruritic rash on upper anterior chest.  Otherwise well.

## 2024-12-24 NOTE — PHYSICAL EXAM
[FreeTextEntry1] : WDWN.  In no acute distress. Skin in RT portals intact. Mild erythema and rash in RT portal.

## 2024-12-24 NOTE — DISEASE MANAGEMENT
[Clinical] : TNM Stage: c [II] : II [FreeTextEntry4] : limited stage [TTNM] : 2 [NTNM] : 0 [MTNM] : 0 [de-identified] : 6336 [de-identified] : 9048 [de-identified] : Right lung

## 2024-12-24 NOTE — DISEASE MANAGEMENT
[Clinical] : TNM Stage: c [II] : II [FreeTextEntry4] : limited stage [TTNM] : 2 [NTNM] : 0 [MTNM] : 0 [de-identified] : 4417 [de-identified] : 1270 [de-identified] : Right lung

## 2024-12-31 NOTE — DISEASE MANAGEMENT
[FreeTextEntry4] : limited stage [TTNM] : 2 [NTNM] : 0 [MTNM] : 0 [de-identified] : 7454 [de-identified] : 9958 [de-identified] : Right lung

## 2024-12-31 NOTE — HISTORY OF PRESENT ILLNESS
[FreeTextEntry1] : The patient is a pleasant 68 year old female diagnosed with limited stage small cell lung cancer referred by Dr. Castrejon. She reports she was sent for a screening chest CT due to her smoking history ( 2 PPD x 20 yrs, now cut down to 2 cigs / day ). She also has COPD and chronic cough.  CT chest 8/13/24 ([facility]) showed enlarging 3.9 x 2.9. 2.6 cm RUL lung mass and new 5x 3.6 x 3.6 cm mass like density in RUL anteromedially.  Navigational bronchoscopy on 09/05/2024. Path of RUL biopsy revealed positive for malignant cells c/w small cell carcinoma. Path of Level 7, 4R, 11R revealed negative for malignant cells. No epithelial lesion identified.  CT brain w/o contrast 10/4/24 showed:  *  No evidence of acute intracranial hemorrhage, midline shift or CT evidence of acute territorial infarct. No area of abnormal enhancement. *  2.3 cm calcified lesion in the scalp at the level of the vertex may represent a sebaceous cyst. Clinical correlation suggested.  PET/CT ([facility]) 10/5/24 FDG avid lobulated right upper lobe mass with necrotic component measures 4.8 x 2.9 cm and approximately 7.5 cm in vertical span with photopenic component and thick rind of activity showing SUV 18.3 compatible with recently biopsy-proven malignancy. There are additional FDG avid opacities in the anteromedial aspect of the right upper lobe (image 85) measuring 3.6 x 3.0 cm with up to SUV 9.6. These opacities are also present on the most recent CT chest measuring 4.5 x 2.5 cm and 7.9 cm in vertical span. IMPRESSION : Right upper lobe FDG avid lobulated mass with intense activity compatible with biopsy-proven malignancy. Nonavid 2.0 cm left adrenal nodule.  Brain MRI 10/4/24 No evidence of metastases. No evidence of acute infarct, or hemorrhage.   There is no evidence of abnormal enhancement. Scattered T2/FLAIR hyperintense signal in the periventricular and pontine white matter, suggestive of mild to moderate chronic microvascular ischemic change.  She lives in  [place] with her . She has chronic mild cough and SOB on exertion- due to COPD, no significant change. Follows with pulmonologist Dr Katie Jauregui.  PFTs on 4/16/24 showed FEV 1 0.78 L and DLCO 46 % predicted. She does not use home O2 but was not cleared for surgery.  She began cycle 1 of chemotherapy last week with Cisplatin/Etopside on 11/1/24 and presents with her friend to discuss the role of radiation therapy in her care.  11/19/24 Patient seen for OTV fx 2/33. Feels well. 2nd cycle chemo this week.  11/26/24. Patient presents for OTV fx 8/33. Feels well. No fatigue. Upset about alopecia.   12/4/24 Patient seen for OTV, fx 11/33. Fatigue and bothered by alopecia.  12/10/24 Patient seen for OTV, fx 15/33. Feels well. NO complaints.  12/17/24 Patient is seen for OTV, Fx 20/33. Only complaint is constipation. Tried stool softener without success. Advised to take Miralax daily.  12/24/24 Patient presents for OTV, fx 25/33  Complains of  pruritic rash on upper anterior chest.  Otherwise well.  12/31/24 Patient is seen for OTV, fx 29/33. Reports cough at night. Using rescue inhaler. Chemo today. I prescribed tessalon perles to use one at bedtime.

## 2024-12-31 NOTE — HISTORY OF PRESENT ILLNESS
[FreeTextEntry1] : The patient is a pleasant 68 year old female diagnosed with limited stage small cell lung cancer referred by Dr. Castrejon. She reports she was sent for a screening chest CT due to her smoking history ( 2 PPD x 20 yrs, now cut down to 2 cigs / day ). She also has COPD and chronic cough.  CT chest 8/13/24 ([facility]) showed enlarging 3.9 x 2.9. 2.6 cm RUL lung mass and new 5x 3.6 x 3.6 cm mass like density in RUL anteromedially.  Navigational bronchoscopy on 09/05/2024. Path of RUL biopsy revealed positive for malignant cells c/w small cell carcinoma. Path of Level 7, 4R, 11R revealed negative for malignant cells. No epithelial lesion identified.  CT brain w/o contrast 10/4/24 showed:  *  No evidence of acute intracranial hemorrhage, midline shift or CT evidence of acute territorial infarct. No area of abnormal enhancement. *  2.3 cm calcified lesion in the scalp at the level of the vertex may represent a sebaceous cyst. Clinical correlation suggested.  PET/CT ([facility]) 10/5/24 FDG avid lobulated right upper lobe mass with necrotic component measures 4.8 x 2.9 cm and approximately 7.5 cm in vertical span with photopenic component and thick rind of activity showing SUV 18.3 compatible with recently biopsy-proven malignancy. There are additional FDG avid opacities in the anteromedial aspect of the right upper lobe (image 85) measuring 3.6 x 3.0 cm with up to SUV 9.6. These opacities are also present on the most recent CT chest measuring 4.5 x 2.5 cm and 7.9 cm in vertical span. IMPRESSION : Right upper lobe FDG avid lobulated mass with intense activity compatible with biopsy-proven malignancy. Nonavid 2.0 cm left adrenal nodule.  Brain MRI 10/4/24 No evidence of metastases. No evidence of acute infarct, or hemorrhage.   There is no evidence of abnormal enhancement. Scattered T2/FLAIR hyperintense signal in the periventricular and pontine white matter, suggestive of mild to moderate chronic microvascular ischemic change.  She lives in  [place] with her . She has chronic mild cough and SOB on exertion- due to COPD, no significant change. Follows with pulmonologist Dr Katie Jauregui.  PFTs on 4/16/24 showed FEV 1 0.78 L and DLCO 46 % predicted. She does not use home O2 but was not cleared for surgery.  She began cycle 1 of chemotherapy last week with Cisplatin/Etopside on 11/1/24 and presents with her friend to discuss the role of radiation therapy in her care.  11/19/24 Patient seen for OTV fx 2/33. Feels well. 2nd cycle chemo this week.  11/26/24. Patient presents for OTV fx 8/33. Feels well. No fatigue. Upset about alopecia.   12/4/24 Patient seen for OTV, fx 11/33. Fatigue and bothered by alopecia.  12/10/24 Patient seen for OTV, fx 15/33. Feels well. NO complaints.  12/17/24 Patient is seen for OTV, Fx 20/33. Only complaint is constipation. Tried stool softener without success. Advised to take Miralax daily.  12/24/24 Patient presents for OTV, fx 25/33  Complains of  pruritic rash on upper anterior chest.  Otherwise well.  12/31/24 Patient is seen for OTV, fx 29/33. Reports cough at night. Using rescue inhaler. Chemo today. I prescribed tessalon perles to use one at bedtime.

## 2024-12-31 NOTE — DISEASE MANAGEMENT
[FreeTextEntry4] : limited stage [TTNM] : 2 [NTNM] : 0 [MTNM] : 0 [de-identified] : 3695 [de-identified] : 5356 [de-identified] : Right lung

## 2025-01-08 NOTE — CONSULT LETTER
[Dear  ___] : Dear  [unfilled], [Consult Letter:] : I had the pleasure of evaluating your patient, [unfilled]. [Please see my note below.] : Please see my note below. [Consult Closing:] : Thank you very much for allowing me to participate in the care of this patient.  If you have any questions, please do not hesitate to contact me. [Sincerely,] : Sincerely, [FreeTextEntry2] : Dr. Katie Jauregui (Pulm/Ref) [FreeTextEntry3] : Kris Shepherd MD  Attending Surgeon  Division of Thoracic Surgery  , Cardiovascular and Thoracic Surgery  [facility] [employer] [facility]

## 2025-01-08 NOTE — ASSESSMENT
[FreeTextEntry1] : Ms. WILBER SORTO, 67 year old female, current smoker (used to smoke 2ppd x 20 years, then cut down to 6-7 cig/day for the past few years), 1 beer per night, w/ hx of HTN, COPD, followed by Dr. Katie Jauregui (Pulm). Patient was referred by Dr. Jauregui for lung nodules.  Patient is s/p Navigational bronchoscopy on 09/05/2024. Path of RUL cryo biopsy revealed positive for malignant cells. Small cell carcinoma. Path of Level 7, 4R, 11R revealed negative for malignant cells. No epithelial lesion identified. The cytology slides and cell block shows a polymorphous population of lymphocytes and macrophages. Seferino Luke cells, granulomas, or abnormal epithelial cells are not identified.  I have reviewed the patient's medical records and diagnostic images at time of this office consultation and have made the following recommendation: 1.      I, RONAN Peralta, personally performed the evaluation and management (E/M) services for this established patient who presents today with (a) new problem(s)/exacerbation of (an) existing condition(s). That E/M includes conducting the examination, assessing all new/exacerbated conditions, and establishing a new plan of care. Today, my ACP, VLAD Garland, was here to observe my evaluation and management services for this new problem/exacerbated condition to be followed going forward.

## 2025-01-08 NOTE — HISTORY OF PRESENT ILLNESS
[FreeTextEntry1] : Ms. WILBER SORTO, 67 year old female, current smoker (used to smoke 2ppd x 20 years, then cut down to 6-7 cig/day for the past few years), 1 beer per night, w/ hx of HTN, COPD, followed by Dr. Katie Jauregui (Pulm). Patient was referred by Dr. Jauregui for lung nodules.  CT chest on 10/18/2023: ([facility]) - 1.5 x 1.2 x 1 cm mass associated with the left major fissure 4:45 series 601 image 64. - Lobular right upper lobe mass abutting the pleural surface laterally and superiorly with slight focal pleural thickening measuring 2.5 x 1.2 x 1.9 cm - 4.: 29 anterior lateral right upper lobe irregular nodule measures 1 x 0.8 cm. - Central right upper lobe 4:20-50 tubular mass extends from right suprahilar region and abuts the right upper lobe bronchus. There is additional extrinsic on the distal right main stem bronchus. This extends for at least 4.6 cm in craniocaudal diameter by 1.8 cm in transverse diameter.  - Series 602 image 30 hazy groundglass nodule right middle lobe measures 0.6 x 0.6 cm. - Nodular pleural thickening seen in the periphery of the right upper lobe. - Few additional scattered tiny pulmonary nodules several appearing calcified suggesting additional granulomatous change. - 2.0 x 1.9 cm left adrenal soft tissue mass cannot exclude metastatic lesion. - Nodular density seen within the right superior breast series 601 image 31 and series 2 image 22.  PET/CT on 10/30/2023: ([facility]) - A chain of FDG avid soft tissue nodules/densities are seen in the left upper lobe, extending from anterolateral right apex, toward the right hilum. These nodules are apparently interconnected. Malignancy versus infection. Biopsy recommended.* The most peripheral anterolateral right apical elongated density SUV 10.7, 22 x 12 mm image 67.* A more proximal nodule image 69, 8 mm, SUV 7.8.* A midway nodule image 78, 16 x 12 minute SUV 4.7.* A nodule adjacent to the right hilar SUV 7.0, image 84, 19 x 13 mm.* A superior right hilar focal activity SUV 4.1, image 90. - Additional non-FDG avid densities are seen, including:* Right upper lobe irregular density image 77, or T millimeter.* A cluster of small opacity image 83 adjacent to the trachea, image 83.* A left major fissure density image 85, 13 mm. - The slightly asymmetric breast tissues show no abnormal FDG uptake. A subcutaneous density in the upper outer quadrant of the right breast, 15 mm, is larger than that in the left breast 8mm, both non-FDG avid. These are indeterminate, some breast tumor may not be FDG avid, correlation with mammogram recommended. Bilateral scattered axillary nodes are non-FDG avid, not significantly enlarged. - Left adrenal nodule 2.4 cm soft tissue attenuation, is nonavid, indeterminate. - A focal cecal uptake, SUV 6.0, correlation with colonoscopy recommended.  CT chest on 8/13/2024: - Moderate centrilobular emphysematous changes are seen throughout both lungs.  - Since the prior CT study of 10-18-23 there has been an interval increase in size of the irregular lobulated lung mass within the superior right upper lobe. This mass currently measures 3.9 cm x 2.9 cm x 2.6 cm previously having measured 2.5 cm x 1.9 cm x 1.2 cm on the CT study 10-18-23. There is a small amount of calcification within the mass.  - There is also increased thickening of the band of density extending from this right upper lobe lung mass inferiorly to the upper right hilum.  - There is a new abnormal mass-like density anteromedially within the right upper lobe along the right side of the mediastinum which measures approximately 5 cm x 3.6 cm x 3.6 cm which is suspicious for a new area of neoplasia.  - There is a 4.6 cm x 1.4 cm irregular nodular density again seen along the medial left major fissure (series 3/image #42) which is without significant change.  - There are areas of scarring/atelectasis within the lingula of the left upper lobe and medially within the posterior left lower lobe. - Few shotty appearing lymph nodes within the precarinal region. - abnormal density within the right upper lobe extends to the superior right hilar region and may be involving the right hilum. - Atherosclerotic calcification within the thoracic aorta and coronary arteries. - Degenerative changes and mild dextroscoliosis of the thoracic spine.  - Stable 2.3 cm x 1.6 cm left adrenal gland nodule.   On 11/08/2023, multiple contiguous PET positive lesions in the RUL. These have the appearance of a impacted dilated bronchus, however given the intense FDG activity malignancy is a possibility. Pts last PFTs were 3/22 which showed severely reduced FEV1 and DLCO. I have asked the patient to obtain repeat PFTs and a needle biopsy. The patient is adamant about not having any procedures at this time. i have explained that if this is cancer it can spread and become non treatable leading to death.   Patient is s/p Navigational bronchoscopy on 09/05/2024. Path of RUL cryo biopsy revealed positive for malignant cells. Small cell carcinoma. Path of Level 7, 4R, 11R revealed negative for malignant cells. No epithelial lesion identified. The cytology slides and cell block shows a polymorphous population of lymphocytes and macrophages. Seferino Luke cells, granulomas, or abnormal epithelial cells are not identified.  PET on 10/05/2024:  - Right suprahilar focus shows SUV 4.9 and right lower paratracheal node 1.0 x 0.7 cm with SUV 3.4; may represent reactive node - FDG avid lobulated right upper lobe mass with necrotic component measures 4.8 x 2.9 cm (image 70) and approximately 7.5 cm in vertical span with photopenic component and thick rind of activity showing SUV 18.3 compatible with recently biopsy-proven malignancy.  - There are additional FDG avid opacities in the anteromedial aspect of the right upper lobe (image 85) measuring 3.6 x 3.0 cm with up to SUV 9.6. These opacities are also present on the most recent CT chest measuring 4.5 x 2.5 cm and 7.9 cm in vertical span. - Atelectatic changes in the lingula - Photopenic/nonavid left adrenal nodule 2.0 x 1.5 cm unchanged from the most recent CT - Atherosclerotic changes in the aorta and its branches.  MR Head w/wo IV cont on 10/14/2024: CARLOS EDUARDO  On 9/18/2024: Path reviewed and explained to patient, referred patient to Hem/Onc for chemo, RTC in 3mo after Hem/Onc consultation and induction chemotherapy, will then discuss next step.  HemOnc: Trudy Shanks. RadOnc: Yoselyn Taylor.  Currently on Chemo and RT???  Patient is here today for a follow up.

## 2025-01-08 NOTE — DATA REVIEWED
[FreeTextEntry1] : I have independently reviewed the following: PET on 10/05/2024 MR Head w/wo IV cont on 10/14/2024

## 2025-01-08 NOTE — CONSULT LETTER
[Dear  ___] : Dear  [unfilled], [Consult Letter:] : I had the pleasure of evaluating your patient, [unfilled]. [Please see my note below.] : Please see my note below. [Consult Closing:] : Thank you very much for allowing me to participate in the care of this patient.  If you have any questions, please do not hesitate to contact me. [Sincerely,] : Sincerely, [FreeTextEntry2] : Dr. Katie Jauregui (Pulm/Ref) [FreeTextEntry3] : Kris Shepherd MD  Attending Surgeon  Division of Thoracic Surgery  , Cardiovascular and Thoracic Surgery  [facility]

## 2025-01-08 NOTE — CONSULT LETTER
[Dear  ___] : Dear  [unfilled], [Consult Letter:] : I had the pleasure of evaluating your patient, [unfilled]. [Please see my note below.] : Please see my note below. [Consult Closing:] : Thank you very much for allowing me to participate in the care of this patient.  If you have any questions, please do not hesitate to contact me. [Sincerely,] : Sincerely, [FreeTextEntry2] : Dr. Katie Jauregui (Pulm/Ref) [FreeTextEntry3] : Kris Shepherd MD  Attending Surgeon  Division of Thoracic Surgery  , Cardiovascular and Thoracic Surgery  [facility] [employer] [facility]

## 2025-01-14 NOTE — REVIEW OF SYSTEMS
[Patient Intake Form Reviewed] : Patient intake form was reviewed [Shortness Of Breath] : shortness of breath [Cough] : cough [Constipation] : constipation [Negative] : Allergic/Immunologic [Fatigue] : fatigue [FreeTextEntry6] : chronic  [de-identified] : poor memory, sleep problems

## 2025-01-14 NOTE — HISTORY OF PRESENT ILLNESS
[Disease: _____________________] : Disease: [unfilled] [T: ___] : T[unfilled] [AJCC Stage: ____] : AJCC Stage: [unfilled] [de-identified] : WILBER SORTO is a 68 y.o. F, current smoker (hx 2 PPD x 20 yrs, now cut down to 6-7 cigs/day few yrs), with a PMH significant for HTN and COPD, who we are following for a limited stage small cell lung cancer.  Presented with enlarging pulmonary nodules. Chronic mild cough and SOB on exertion - no significant change. Follows with pulmonologist Dr Katie Jauregui ([place]- [phone]).  No weight loss. Overall feels OK - "as usual".  8/13/24 - CT Chest ([facility]) - Enlarging 3.9 x 2.9. 2.6 cm RUL lung mass and new 5 x 3.6 x 3.6 cm mass like density in RUL anteromedially. 9/5/24 - Navigational bronchoscopy - Path: RUL Cryo biopsy and RUL BAL positive for malignant cells - Small cell carcinoma. Path of Level 7, 4R, 11R negative for malignant cells. No epithelial lesion identified.  10/5/24 - PET/CT - 4.8 x 2.9 x approximately 7.5 cm in vertical span RUL mass with necrotic component, SUV 18.3, c/w recent biopsy-proven malignancy. There are additional FDG avid opacities in the anteromedial aspect of the RUL measuring 3.6 x 3.0 cm with up to SUV 9.6. These opacities are also present on the most recent CT chest measuring 4.5 x 2.5 cm and 7.9 cm in vertical span.  Non-avid 2.0 cm left adrenal nodule. 10/14/24 - Brain MRI - No evidence of metastases. No evidence of acute infarct, or hemorrhage.   PFTs on 4/16/24 showed FEV 1 0.78 L and DLCO 46 % predicted. She does not use home O2 but was not cleared for surgery.  11/1/24 - Started CDDP/Etoposide. C#4 12/31/24.  11/18/24 - Started XRT prior to C#2, XRT completed 1/7/25  [de-identified] : Small cell [de-identified] : PD-L1 0% [de-identified] : Patient D21 C4 CDDP/Etoposide 11/18/24 - 1/7/25 - XRT.   Patient with increased fatigue since last visit, increased OLIVER.  Also has a cough.   Baseline constipation.  Has a cyst on her scalp - would like to see derm to have it removed.  Denies any other changes in her family, medical, or social history since her last visit of 12/23/24.  patient

## 2025-01-14 NOTE — REASON FOR VISIT
[Follow-Up Visit] : a follow-up [Friend] : friend [FreeTextEntry2] : Locally advanced small cell lung cancer - D21 C4 CDDP/Etoposide/RT

## 2025-01-14 NOTE — RESULTS/DATA
[FreeTextEntry1] : WBC: 2.26, ANC: 1.48, Hgb: 8.6, Hct: 24.4, MCV: 91.4, Plts: 131 CMP, Mag: pending; Iron studies added

## 2025-01-14 NOTE — HISTORY OF PRESENT ILLNESS
[Disease: _____________________] : Disease: [unfilled] [T: ___] : T[unfilled] [AJCC Stage: ____] : AJCC Stage: [unfilled] [de-identified] : WILBER SORTO is a 68 y.o. F, current smoker (hx 2 PPD x 20 yrs, now cut down to 6-7 cigs/day few yrs), with a PMH significant for HTN and COPD, who we are following for a limited stage small cell lung cancer.  Presented with enlarging pulmonary nodules. Chronic mild cough and SOB on exertion - no significant change. Follows with pulmonologist Dr Katie Jauregui ([place]- [phone]).  No weight loss. Overall feels OK - "as usual".  8/13/24 - CT Chest ([facility]) - Enlarging 3.9 x 2.9. 2.6 cm RUL lung mass and new 5 x 3.6 x 3.6 cm mass like density in RUL anteromedially. 9/5/24 - Navigational bronchoscopy - Path: RUL Cryo biopsy and RUL BAL positive for malignant cells - Small cell carcinoma. Path of Level 7, 4R, 11R negative for malignant cells. No epithelial lesion identified.  10/5/24 - PET/CT - 4.8 x 2.9 x approximately 7.5 cm in vertical span RUL mass with necrotic component, SUV 18.3, c/w recent biopsy-proven malignancy. There are additional FDG avid opacities in the anteromedial aspect of the RUL measuring 3.6 x 3.0 cm with up to SUV 9.6. These opacities are also present on the most recent CT chest measuring 4.5 x 2.5 cm and 7.9 cm in vertical span.  Non-avid 2.0 cm left adrenal nodule. 10/14/24 - Brain MRI - No evidence of metastases. No evidence of acute infarct, or hemorrhage.   PFTs on 4/16/24 showed FEV 1 0.78 L and DLCO 46 % predicted. She does not use home O2 but was not cleared for surgery.  11/1/24 - Started CDDP/Etoposide. C#4 12/31/24.  11/18/24 - Started XRT prior to C#2, XRT completed 1/7/25  [de-identified] : Small cell [de-identified] : PD-L1 0% [de-identified] : Patient D21 C4 CDDP/Etoposide 11/18/24 - 1/7/25 - XRT.   Patient with increased fatigue since last visit, increased OLIVER.  Also has a cough.   Baseline constipation.  Has a cyst on her scalp - would like to see derm to have it removed.  Denies any other changes in her family, medical, or social history since her last visit of 12/23/24.

## 2025-01-14 NOTE — REVIEW OF SYSTEMS
[Patient Intake Form Reviewed] : Patient intake form was reviewed [Shortness Of Breath] : shortness of breath [Cough] : cough [Constipation] : constipation [Negative] : Allergic/Immunologic [Fatigue] : fatigue [FreeTextEntry6] : chronic  [de-identified] : poor memory, sleep problems

## 2025-01-14 NOTE — PHYSICAL EXAM
[Normal] : affect appropriate [de-identified] : looks well  [de-identified] : anicteric [de-identified] : no c/c/e [de-identified] : no rashes, alopecia, large but soft cyst on top of head.

## 2025-01-14 NOTE — ASSESSMENT
[FreeTextEntry1] : Patient is a 68 y.o. with COPD, with limited stage small cell lung cancer RUL, negative mediastinal node sampling on navigational bronchoscopy. Was not a surgical candidate due to poor PFT's, but had a good performance status.   11/1/24 - 12/31/24 - CDDP/Etoposide x 4.  11/18/24 - Started XRT prior to C#2 ~ 6 1/2 weeks - completed 1/6/25.    Today D21 C4 CDDP/Etoposide Patient tolerated as expected.  Now with anemia.   Plan -  1. Anemia - will add on iron studies.  2. Scalp Cyst - Given names of local dermatologist.  3. Onc - Get CT Chest in ~ 3 weeks, week of 2/10/25.   If stable / improved- start immunotherapy consolidation -Durvalumab [id number] mg q28 days x one year.  Scheduled to start on 2/19/25.  Briefly reviewed immunotherapy and possible side effects  4. Memory loss - patient had a very difficult time understanding that she needed to schedule the CT scan prior to 2/19/25 visit.  Her friend understood and will help her with this.  If this worsens may need to repeat MRI brain to r/o mets.   Dr. Glenna Armijo (PCP) Dr. Katie Jauregui (Pulm - [place]- [phone]) Dr. Yoselyn Taylor (Rad/Onc)

## 2025-01-14 NOTE — PHYSICAL EXAM
[Normal] : affect appropriate [de-identified] : looks well  [de-identified] : anicteric [de-identified] : no c/c/e [de-identified] : no rashes, alopecia, large but soft cyst on top of head.

## 2025-01-14 NOTE — ASSESSMENT
[FreeTextEntry1] : Patient is a 68 y.o. with COPD, with limited stage small cell lung cancer RUL, negative mediastinal node sampling on navigational bronchoscopy. Was not a surgical candidate due to poor PFT's, but had a good performance status.   11/1/24 - 12/31/24 - CDDP/Etoposide x 4.  11/18/24 - Started XRT prior to C#2 ~ 6 1/2 weeks - completed 1/6/25.    Today D21 C4 CDDP/Etoposide Patient tolerated as expected.  Now with anemia.   Plan -  1. Anemia - will add on iron studies.  2. Scalp Cyst - Given names of local dermatologist.  3. Onc - Get CT Chest in ~ 3 weeks, week of 2/10/25.   If stable / improved- start immunotherapy consolidation -Durvalumab [id number] mg q28 days x one year.  Scheduled to start on 2/19/25.  Briefly reviewed immunotherapy and possible side effects  4. Memory loss - patient had a very difficult time understanding that she needed to schedule the CT scan prior to 2/19/25 visit.  Her friend understood and will help her with this.  If this worsens may need to repeat MRI brain to r/o mets.   Dr. Glenna Armijo (PCP) Dr. Katie Jauregui (Pulm - [place]- [phone]) Dr. Yoselyn Taylor (Rad/Onc)

## 2025-02-06 NOTE — HISTORY OF PRESENT ILLNESS
[FreeTextEntry1] : The patient is a pleasant 68 year old female diagnosed with limited stage small cell lung cancer referred by Dr. Castrejon. She reports she was sent for a screening chest CT due to her smoking history ( 2 PPD x 20 yrs, now cut down to 2 cigs / day ). She also has COPD and chronic cough.  CT chest 8/13/24 ([facility]) showed enlarging 3.9 x 2.9. 2.6 cm RUL lung mass and new 5x 3.6 x 3.6 cm mass like density in RUL anteromedially.  Navigational bronchoscopy on 09/05/2024. Path of RUL biopsy revealed positive for malignant cells c/w small cell carcinoma. Path of Level 7, 4R, 11R revealed negative for malignant cells. No epithelial lesion identified.  CT brain w/o contrast 10/4/24 showed:  *  No evidence of acute intracranial hemorrhage, midline shift or CT evidence of acute territorial infarct. No area of abnormal enhancement. *  2.3 cm calcified lesion in the scalp at the level of the vertex may represent a sebaceous cyst. Clinical correlation suggested.  PET/CT ([facility]) 10/5/24 FDG avid lobulated right upper lobe mass with necrotic component measures 4.8 x 2.9 cm and approximately 7.5 cm in vertical span with photopenic component and thick rind of activity showing SUV 18.3 compatible with recently biopsy-proven malignancy. There are additional FDG avid opacities in the anteromedial aspect of the right upper lobe (image 85) measuring 3.6 x 3.0 cm with up to SUV 9.6. These opacities are also present on the most recent CT chest measuring 4.5 x 2.5 cm and 7.9 cm in vertical span. IMPRESSION : Right upper lobe FDG avid lobulated mass with intense activity compatible with biopsy-proven malignancy. Nonavid 2.0 cm left adrenal nodule.  Brain MRI 10/4/24 No evidence of metastases. No evidence of acute infarct, or hemorrhage.   There is no evidence of abnormal enhancement. Scattered T2/FLAIR hyperintense signal in the periventricular and pontine white matter, suggestive of mild to moderate chronic microvascular ischemic change.  She lives in  [place] with her . She has chronic mild cough and SOB on exertion- due to COPD, no significant change. Follows with pulmonologist Dr Katie Jauregui.  PFTs on 4/16/24 showed FEV 1 0.78 L and DLCO 46 % predicted. She does not use home O2 but was not cleared for surgery.  She began cycle 1 of chemotherapy last week with Cisplatin/Etopside on 11/1/24 and presents with her friend to discuss the role of radiation therapy in her care.  11/19/24 Patient seen for OTV fx 2/33. Feels well. 2nd cycle chemo this week.  11/26/24. Patient presents for OTV fx 8/33. Feels well. No fatigue. Upset about alopecia.   12/4/24 Patient seen for OTV, fx 11/33. Fatigue and bothered by alopecia.  12/10/24 Patient seen for OTV, fx 15/33. Feels well. NO complaints.  12/17/24 Patient is seen for OTV, Fx 20/33. Only complaint is constipation. Tried stool softener without success. Advised to take Miralax daily.  12/24/24 Patient presents for OTV, fx 25/33  Complains of  pruritic rash on upper anterior chest.  Otherwise well.  12/31/24 Patient is seen for OTV, fx 29/33. Reports cough at night. Using rescue inhaler. Chemo today. I prescribed Tessalon pearls to use one at bedtime.  Patient completed RT to the Right lung with 6600 cGy in 33 fractions, finished on 1/7/25.  2/6/25 Patient presents for 1 month PTE. She states she is feeling well overall with periods of fatigue; she does not go out too much, she is not happy about her hair loss and would like to see it coming back, she does not like to wear a wig. Cough is occasional, SOB only on exertion and no new symptoms to report. She did experience pruritic rash on her chest towards the end of RT but now resolved. She denies any neurological changes, headaches or memory problems. Her CT chest is scheduled for 2/10/25 with Micki, ordered by Dr. Casterjon. She is to start Immunotherapy with If CT results are stable / improved- start immunotherapy consolidation -Durvalumab [id number] mg q28 days x one year. Scheduled to start on 2/19/25.

## 2025-02-17 NOTE — REASON FOR VISIT
[Follow-Up Visit] : a follow-up [FreeTextEntry2] : limited stage  small cell lung cancer s/p chemoRT

## 2025-02-17 NOTE — HISTORY OF PRESENT ILLNESS
[Disease: _____________________] : Disease: [unfilled] [T: ___] : T[unfilled] [AJCC Stage: ____] : AJCC Stage: [unfilled] [de-identified] : WILBER SORTO is a 68 y.o. F, current smoker (hx 2 PPD x 20 yrs, now cut down to 6-7 cigs/day few yrs), with a PMH significant for HTN and COPD, who we are following for a small cell lung cancer.  She presented with enlarging pulmonary nodules. Chronic mild cough and SOB on exertion - no significant change. Follows with pulmonologist Dr Katie Jauregui ( [place]- [phone]).  No weight loss. Overall feels OK - "as usual".  8/13/24 - CT Chest ([facility]) - Enlarging 3.9 x 2.9. 2.6 cm RUL lung mass and new 5 x 3.6 x 3.6 cm mass like density in RUL anteromedially. 9/5/24 - Navigational bronchoscopy - Path: RUL Cryo biopsy and RUL BAL positive for malignant cells - Small cell carcinoma. Path of Level 7, 4R, 11R negative for malignant cells. No epithelial lesion identified.  10/5/24 - PET/CT - 4.8 x 2.9 x approximately 7.5 cm in vertical span RUL mass with necrotic component, SUV 18.3, c/w recent biopsy-proven malignancy. There are additional FDG avid opacities in the anteromedial aspect of the RUL measuring 3.6 x 3.0 cm with up to SUV 9.6. These opacities are also present on the most recent CT chest measuring 4.5 x 2.5 cm and 7.9 cm in vertical span.  Nonavid 2.0 cm left adrenal nodule. 10/14/24 - Brain MRI - No evidence of metastases. No evidence of acute infarct, or hemorrhage.   PFTs on 4/16/24 showed FEV 1 0.78 L and DLCO 46 % predicted. She does not use home O2 but was not cleared for surgery.  11/1/24 - Started CDDP/Etoposide.  Completed cycle 4 on 12/31/24 11/18/24 - Started XRT prior to C#2. Completed RT to the Right lung with 6600 cGy in 33 fractions, finished on 1/7/25.  CT chest 2/10/25 [place] interval marked improvement of the right upper lobe nodules and opacities since October 5, 2024.    [de-identified] : Small cell [de-identified] : PD-L1 0%

## 2025-02-17 NOTE — ASSESSMENT
[FreeTextEntry1] : 67 y/o female with COPD, with limited stage small cell lung cancer RUL, negative mediastinal node sampling on navigational bronchoscopy. Not a surgical candidate due to poor PFT's, else her performance status is quite good.  11/1/24 - Started on chemotherapy with CDDP/Etoposide.  11/18/24 - Started XRT prior to C#2 - to go over ~ 6 1/2 weeks.    Completed 4 cycles of Etoposide/ cisplatin on 12/31/25 Completed RT to R lung 1/7/25   CT chest 2/10/25 showed good response to tx  (durvalumab [id number] mg q28 days x one year)   Dr. Glenna Armijo (PCP) Dr. Katie Jauregui (Pulm - [place]- [phone])

## 2025-02-17 NOTE — RESULTS/DATA
[FreeTextEntry1] : CT chest 2/10/25  Overall interval marked improvement of the right upper lobe nodules and opacities since October 5, 2024. Left lower lobe basilar patchy opacity new since October 5, 2024 concerning for pneumonia. Opacification of the left lower lobe lobar bronchus extending into the segmental airways appears to be due to internal secretions. A 1-3 month follow-up noncontrast chest CT is recommended to ensure improvement/resolution.

## 2025-02-19 NOTE — RESULTS/DATA
[FreeTextEntry1] : CT chest 2/10/25  Overall interval marked improvement of the right upper lobe nodules and opacities since October 5, 2024. Left lower lobe basilar patchy opacity new since October 5, 2024 concerning for pneumonia. Opacification of the left lower lobe lobar bronchus extending into the segmental airways appears to be due to internal secretions. A 1-3 month follow-up noncontrast chest CT is recommended to ensure improvement/resolution.  I personally reviewed CT images

## 2025-02-19 NOTE — ASSESSMENT
[FreeTextEntry1] : 67 y/o female with COPD, with limited stage small cell lung cancer RUL, negative mediastinal node sampling on navigational bronchoscopy. Not a surgical candidate due to poor PFT's, else her performance status is quite good.  11/1/24 - Started on chemotherapy with CDDP/Etoposide.  11/18/24 - Started XRT prior to C#2.    Completed 4 cycles of Etoposide/ cisplatin on 12/31/25 Completed RT to R lung 1/7/25   CT chest 2/10/25 showed good response to tx Plan for  durvalumab [id number] mg q28 days x one year- start today. Discussed in details benefits and potential adverse effects of durvalumab. Discussed monitoring.  Recent cough and new patchy LLL opacities- possible pneumonia. Minimally symptomatic ( mild cough) Will give course of antibiotics Rx Zithromax Z pack. Follow up CT chest in ~ 6 - 8 weeks / sooner PRN.  Dr. Glenna Armijo (PCP) Dr. Katie Jauregui (Pulm - [place]- [phone])

## 2025-02-19 NOTE — REASON FOR VISIT
[Follow-Up Visit] : a follow-up [Friend] : friend [FreeTextEntry2] : limited stage  small cell lung cancer s/p chemoRT

## 2025-02-19 NOTE — ASSESSMENT
[FreeTextEntry1] : 67 y/o female with COPD, with limited stage small cell lung cancer RUL, negative mediastinal node sampling on navigational bronchoscopy. Not a surgical candidate due to poor PFT's, else her performance status is quite good.  11/1/24 - Started on chemotherapy with CDDP/Etoposide.  11/18/24 - Started XRT prior to C#2.    Completed 4 cycles of Etoposide/ cisplatin on 12/31/25 Completed RT to R lung 1/7/25   CT chest 2/10/25 showed good response to tx Plan for  durvalumab [id number] mg q28 days x one year- start today. Discussed in details benefits and potential adverse effects of durvalumab. Discussed monitoring.  Recent cough and new patchy LLL opacities- possible pneumonia. Minimally symptomatic ( mild cough) Will give course of antibiotics Rx Zithromax Z pack. Follow up CT chest in ~ 6 - 8 weeks / sooner PRN.  Dr. Glenna Armijo (PCP) Dr. Katie Jauregui (Pulm - [place]- [phone])

## 2025-02-19 NOTE — HISTORY OF PRESENT ILLNESS
[Disease: _____________________] : Disease: [unfilled] [T: ___] : T[unfilled] [AJCC Stage: ____] : AJCC Stage: [unfilled] [de-identified] : WILBER SORTO is a 68 y.o. F, current smoker (hx 2 PPD x 20 yrs, now cut down to 6-7 cigs/day few yrs), with a PMH significant for HTN and COPD, who we are following for a small cell lung cancer.  She presented with enlarging pulmonary nodules. Chronic mild cough and SOB on exertion - no significant change. Follows with pulmonologist Dr Katie Jauregui ( [place]- [phone]).  No weight loss. Overall feels OK - "as usual".  8/13/24 - CT Chest ([facility]) - Enlarging 3.9 x 2.9. 2.6 cm RUL lung mass and new 5 x 3.6 x 3.6 cm mass like density in RUL anteromedially. 9/5/24 - Navigational bronchoscopy - Path: RUL Cryo biopsy and RUL BAL positive for malignant cells - Small cell carcinoma. Path of Level 7, 4R, 11R negative for malignant cells. No epithelial lesion identified.  10/5/24 - PET/CT - 4.8 x 2.9 x approximately 7.5 cm in vertical span RUL mass with necrotic component, SUV 18.3, c/w recent biopsy-proven malignancy. There are additional FDG avid opacities in the anteromedial aspect of the RUL measuring 3.6 x 3.0 cm with up to SUV 9.6. These opacities are also present on the most recent CT chest measuring 4.5 x 2.5 cm and 7.9 cm in vertical span.  Nonavid 2.0 cm left adrenal nodule. 10/14/24 - Brain MRI - No evidence of metastases. No evidence of acute infarct, or hemorrhage.   PFTs on 4/16/24 showed FEV 1 0.78 L and DLCO 46 % predicted. She does not use home O2 but was not cleared for surgery.  11/1/24 - Started CDDP/Etoposide.  Completed cycle 4 on 12/31/24 11/18/24 - Started XRT prior to C#2. Completed RT to the Right lung with 6600 cGy in 33 fractions, finished on 1/7/25.  CT chest 2/10/25 In[place] interval marked improvement of the right upper lobe nodules and opacities since October 5, 2024.    [de-identified] : Small cell [de-identified] : PD-L1 0% [de-identified] : HAd cough few days ago, she was prescribed cough medication. Now cough improving No fevers No dyspnea. Overall feels OK

## 2025-02-19 NOTE — HISTORY OF PRESENT ILLNESS
[Disease: _____________________] : Disease: [unfilled] [T: ___] : T[unfilled] [AJCC Stage: ____] : AJCC Stage: [unfilled] [de-identified] : WILBER SORTO is a 68 y.o. F, current smoker (hx 2 PPD x 20 yrs, now cut down to 6-7 cigs/day few yrs), with a PMH significant for HTN and COPD, who we are following for a small cell lung cancer.  She presented with enlarging pulmonary nodules. Chronic mild cough and SOB on exertion - no significant change. Follows with pulmonologist Dr Katie Jauregui ( [place]- [phone]).  No weight loss. Overall feels OK - "as usual".  8/13/24 - CT Chest ([facility]) - Enlarging 3.9 x 2.9. 2.6 cm RUL lung mass and new 5 x 3.6 x 3.6 cm mass like density in RUL anteromedially. 9/5/24 - Navigational bronchoscopy - Path: RUL Cryo biopsy and RUL BAL positive for malignant cells - Small cell carcinoma. Path of Level 7, 4R, 11R negative for malignant cells. No epithelial lesion identified.  10/5/24 - PET/CT - 4.8 x 2.9 x approximately 7.5 cm in vertical span RUL mass with necrotic component, SUV 18.3, c/w recent biopsy-proven malignancy. There are additional FDG avid opacities in the anteromedial aspect of the RUL measuring 3.6 x 3.0 cm with up to SUV 9.6. These opacities are also present on the most recent CT chest measuring 4.5 x 2.5 cm and 7.9 cm in vertical span.  Nonavid 2.0 cm left adrenal nodule. 10/14/24 - Brain MRI - No evidence of metastases. No evidence of acute infarct, or hemorrhage.   PFTs on 4/16/24 showed FEV 1 0.78 L and DLCO 46 % predicted. She does not use home O2 but was not cleared for surgery.  11/1/24 - Started CDDP/Etoposide.  Completed cycle 4 on 12/31/24 11/18/24 - Started XRT prior to C#2. Completed RT to the Right lung with 6600 cGy in 33 fractions, finished on 1/7/25.  CT chest 2/10/25 [place] interval marked improvement of the right upper lobe nodules and opacities since October 5, 2024.    [de-identified] : Small cell [de-identified] : PD-L1 0% [de-identified] : HAd cough few days ago, she was prescribed cough medication. Now cough improving No fevers No dyspnea. Overall feels OK

## 2025-02-19 NOTE — PHYSICAL EXAM
[Restricted in physically strenuous activity but ambulatory and able to carry out work of a light or sedentary nature] : Status 1- Restricted in physically strenuous activity but ambulatory and able to carry out work of a light or sedentary nature, e.g., light house work, office work [Normal] : affect appropriate [de-identified] : looks well  [de-identified] : no mucositis [de-identified] : clear

## 2025-02-19 NOTE — PHYSICAL EXAM
[Restricted in physically strenuous activity but ambulatory and able to carry out work of a light or sedentary nature] : Status 1- Restricted in physically strenuous activity but ambulatory and able to carry out work of a light or sedentary nature, e.g., light house work, office work [Normal] : affect appropriate [de-identified] : looks well  [de-identified] : no mucositis [de-identified] : clear

## 2025-03-19 NOTE — ASSESSMENT
[FreeTextEntry1] : Patient is a 68 y.o. with COPD, with limited stage small cell lung cancer RUL, negative mediastinal node sampling on navigational bronchoscopy. Was not a surgical candidate due to poor PFT's, but had a good performance status.   11/1/24 - 12/31/24 - CDDP/Etoposide x 4.  11/18/24 - 1/7/25 - XRT to the Right lung with 6600 cGy in 33 fractions. 2/10/25 - CT Chest - Interval marked improvement of the RUL nodules and opacities. LLL basilar patchy opacity new since 10/5/24 concerning for pneumonia. Opacification of the LLL bronchus extending into the segmental airways appears to be due to internal secretions. s/p Rx Zithromax Z pack.  2/18/25 - Started Durvalumab [id number] q28 days x one year.   3/12/25 - Patient called re: 2-week hx of SOB.  Sent for STAT CT Angio - r/o PE, r/o I/O or RT pneumonitis - No pulmonary embolism. No new findings to suggest pneumonitis. Overall increased mucous plugging of the lingular and left lower lobe segmental and subsegmental bronchi compared to February 2025. Improving patchy opacities in the posterior LLL. Stable RUL nodules and associated interlobular septal thickening. Patient's inhalers were changed.  Moderate improvement.   Plan -  1. Resp - Patient has f/u with pulmonologist in April.  O2 sat today 93%.  Patient instructed to call should she have recurrent dyspnea.  Still need to monitor for pneumonitis.  2. Weight loss - Dyspnea, weight loss may have been due to a virus - hopefully will resolve soon.  Patient does report a good appetite. Declined to speak with dietician.  3. Onc - Durva #3 due 3/19/25. PE same day.     Dr. Glenna Armijo (PCP) Dr. Katie Jauregui (Pulm - [place]- [phone]) Dr. Yoselyn Taylor (Rad/Onc)

## 2025-03-19 NOTE — REVIEW OF SYSTEMS
[Patient Intake Form Reviewed] : Patient intake form was reviewed [Recent Change In Weight] : ~T recent weight change [Shortness Of Breath] : shortness of breath [Negative] : Allergic/Immunologic [FreeTextEntry2] : lost ~ 4 pounds [FreeTextEntry6] : chronic  [de-identified] : poor memory, sleep problems

## 2025-03-19 NOTE — HISTORY OF PRESENT ILLNESS
[Disease: _____________________] : Disease: [unfilled] [T: ___] : T[unfilled] [AJCC Stage: ____] : AJCC Stage: [unfilled] [de-identified] : WILBER SORTO is a 68 y.o. F, current smoker (hx 2 PPD x 20 yrs, now cut down to 6-7 cigs/day few yrs), with a PMH significant for HTN and COPD, who we are following for a limited stage small cell lung cancer.  Presented with enlarging pulmonary nodules. Chronic mild cough and SOB on exertion - no significant change. Follows with pulmonologist Dr Katie Jauregui ([place]- [phone]).  No weight loss. Overall feels OK - "as usual".  8/13/24 - CT Chest ([facility]) - Enlarging 3.9 x 2.9. 2.6 cm RUL lung mass and new 5 x 3.6 x 3.6 cm mass like density in RUL anteromedially. 9/5/24 - Navigational bronchoscopy - Path: RUL Cryo biopsy and RUL BAL positive for malignant cells - Small cell carcinoma. Path of Level 7, 4R, 11R negative for malignant cells. No epithelial lesion identified.  10/5/24 - PET/CT - 4.8 x 2.9 x approximately 7.5 cm in vertical span RUL mass with necrotic component, SUV 18.3, c/w recent biopsy-proven malignancy. There are additional FDG avid opacities in the anteromedial aspect of the RUL measuring 3.6 x 3.0 cm with up to SUV 9.6. These opacities are also present on the most recent CT chest measuring 4.5 x 2.5 cm and 7.9 cm in vertical span.  Non-avid 2.0 cm left adrenal nodule. 10/14/24 - Brain MRI - No evidence of metastases. No evidence of acute infarct, or hemorrhage.   PFTs on 4/16/24 showed FEV 1 0.78 L and DLCO 46 % predicted. She does not use home O2 but was not cleared for surgery.  11/1/24 - 12/31/24 - CDDP/Etoposide x 4.  11/18/24 - 1/7/25 - XRT to the Right lung with 6600 cGy in 33 fractions.  2/10/25 - CT Chest - Interval marked improvement of the RUL nodules and opacities. LLL basilar patchy opacity new since 10/5/24 concerning for pneumonia. Opacification of the LLL bronchus extending into the segmental airways appears to be due to internal secretions. s/p Rx Zithromax Z pack.  2/18/25 - Started Durvalumab [id number] q28 days x one year. [de-identified] : Small cell [de-identified] : PD-L1 0% [de-identified] : D1 C2 maintenance Durva. Patient called on 3/12/25 reporting trouble breathing.  States very SOB x 1 week.  Expressed that she had a cold ~ 2 weeks ago and called her pulmonologist who ordered a Breztri inhaler. Patient also completed Z pack ordered by Dr. Castrejon on 2/18 for recent cough and new patchy LLL opacities- possible pneumonia. We were concerned that she might be developing a pneumonitis 2nd to I/O therapy or a PE -- STAT CT Angio done -  3/12/25 - CT Angio - No pulmonary embolism. No new findings to suggest pneumonitis. Overall increased mucous plugging of the lingular and left lower lobe segmental and subsegmental bronchi compared to February 2025. Improving patchy opacities in the posterior LLL. Stable RUL nodules and associated interlobular septal thickening. Report was faxed to patient's pulmonologist Dr. Katie Jauregui ([place]- [phone]). Patient was advised to follow his advice re: inhalers.   She was instructed to stop Trellegy and start Breztri.  She admitted that she was told she wasn't using the inhalers right as she was taking 2 puffs at once - was recently re-educated on how to use properly.  She feels that the Breztri is not working as well as she doesn't feel it "kicking in" after and has been needing to use the albuterol more often.  She does states she is breathing better now, but states she was really SOB during that episode last week and was very scared.  Has a f/u with the pulmonologist in April. She is also concerned as she has lost ~ 4 pounds.  She states she has no GI issues - no N/V/C/D.  She reports she actually has a good appetite and has been eating a lot - so she is concerned that she has lost weight despite this. Was given some Ensure samples by the RN today.  Denies any other changes in her family, medical, or social history since her last visit of 2/18/25.

## 2025-03-19 NOTE — PHYSICAL EXAM
[Normal] : affect appropriate [de-identified] : anicteric [de-identified] : no c/c/e [de-identified] : no rashes

## 2025-03-19 NOTE — REASON FOR VISIT
[Follow-Up Visit] : a follow-up [FreeTextEntry2] : Locally advanced small cell lung cancer - D1 C2 maintenance Durva

## 2025-04-10 NOTE — ASSESSMENT
[FreeTextEntry1] : #Pilar cyst  - education & counseling  - will schedule for surgery with surgery team  - photos taken today   RTC PRN

## 2025-04-10 NOTE — PHYSICAL EXAM
[Alert] : alert [Oriented x 3] : ~L oriented x 3 [Well Nourished] : well nourished [Declined] : declined [FreeTextEntry3] :  Focused skin exam performed (per patient preference). Areas examined as below:   - large skin colored nodule on vertex scalp

## 2025-04-10 NOTE — HISTORY OF PRESENT ILLNESS
[FreeTextEntry1] : bump on scalp [de-identified] : 69yo F here for:   #bump on scalp for many years. Now bothering her as hair is shorter 2/2 chemo for lung cancer   Skin Cancer Hx: denies Family Hx: denies

## 2025-04-22 NOTE — REVIEW OF SYSTEMS
[Fever] : no fever [Chills] : no chills [Feeling Poorly] : not feeling poorly [Feeling Tired] : not feeling tired [Recent Weight Gain (___ Lbs)] : no recent weight gain [Recent Weight Loss (___ Lbs)] : recent [unfilled] ~Ulb weight loss [Shortness Of Breath] : shortness of breath [Wheezing] : wheezing [Cough] : cough [SOB on Exertion] : shortness of breath during exertion [Orthopnea] : no orthopnea [PND] : no PND [Negative] : Heme/Lymph [FreeTextEntry6] : has lung cancer

## 2025-04-22 NOTE — PHYSICAL EXAM
[JVD] : no jugular venous distention  [Normal Heart Sounds] : normal heart sounds [Purpura] : no purpura  [Petechiae] : no petechiae [Skin Ulcer] : no ulcer [Skin Induration] : no induration [Alert] : alert [Oriented to Person] : oriented to person [Oriented to Place] : oriented to place [Oriented to Time] : oriented to time [Calm] : calm [de-identified] : well developed white female in no distress [de-identified] : non injected and non icteric [de-identified] : without adenopathy [de-identified] : decreased breath sounds [de-identified] : benign [de-identified] : 3 by 3 cm movable scalp mass

## 2025-04-22 NOTE — ASSESSMENT
[FreeTextEntry1] : I have discussed the risks, benefits and options. Pt would like to schedule the removal of her scalp mass in the out pt department.

## 2025-04-22 NOTE — HISTORY OF PRESENT ILLNESS
[de-identified] : mass of scalp [de-identified] : 68 year old white female with lump cancer on hormonal therapy who presents c/o a scalp mass that she has had for many years that had gradually increased in size. She denies any redness or drainage from the area.

## 2025-05-12 NOTE — HISTORY OF PRESENT ILLNESS
[de-identified] : s/p Excision Scalp Mass 05/05/2025 [de-identified] : Patient with mild discomfort, but denies fever, chills or wound drainage.  Final Diagnosis Scalp mass: - Epidermal cyst with foci of coarse calcifications and unremarkable overlying skin.

## 2025-05-12 NOTE — PLAN
[FreeTextEntry1] : -Avoid bacitracin to incision site. -Avoid sun-exposure to incision site. -Follow up next week for removal of remaining nylon sutures.

## 2025-05-12 NOTE — PHYSICAL EXAM
[Alert] : alert [Oriented to Person] : oriented to person [Oriented to Place] : oriented to place [Oriented to Time] : oriented to time [Calm] : calm [de-identified] : Well-developed white female in NAD [de-identified] : incision with eschar nylon sutures intact [de-identified] : calves soft, non-tender

## 2025-05-12 NOTE — ASSESSMENT
[FreeTextEntry1] : every other nylon suture removed under aseptic conditions incision clean and closed

## 2025-05-19 NOTE — HISTORY OF PRESENT ILLNESS
[de-identified] : mass of scalp, s/p excision on 5/5/25 [de-identified] : Pt with new dx of pneumonia, denies any problems from her scalp excision

## 2025-05-19 NOTE — PHYSICAL EXAM
[Respiratory Effort] : normal respiratory effort [Normal Heart Sounds] : normal heart sounds [Calm] : calm [de-identified] : well developed female in no distress [de-identified] : incision clean and closed, sutures removed.

## 2025-06-01 NOTE — HISTORY OF PRESENT ILLNESS
[Discharge Summary] : discharge summary [Med Reconciliation] : medication reconciliation has been completed [Discharge Med List] : discharge medication list [Post-hospitalization from ___ Hospital] : Post-hospitalization from [unfilled] Hospital [Admitted on: ___] : The patient was admitted on [unfilled] [Discharged on ___] : discharged on [unfilled] [Patient Contacted By: ____] : and contacted by [unfilled]

## 2025-06-01 NOTE — PHYSICAL EXAM
[No Acute Distress] : no acute distress [Normal Sclera/Conjunctiva] : normal sclera/conjunctiva [Normal Outer Ear/Nose] : the outer ears and nose were normal in appearance [No JVD] : no jugular venous distention [No Respiratory Distress] : no respiratory distress  [Normal Rate] : normal rate  [No Edema] : there was no peripheral edema [Soft] : abdomen soft [No Rash] : no rash [Normal Gait] : normal gait [Coordination Grossly Intact] : coordination grossly intact [No Focal Deficits] : no focal deficits [Normal Affect] : the affect was normal [Normal Insight/Judgement] : insight and judgment were intact [de-identified] : TORI ma

## 2025-06-01 NOTE — HISTORY OF PRESENT ILLNESS
[Discharge Summary] : discharge summary [Discharge Med List] : discharge medication list [Med Reconciliation] : medication reconciliation has been completed [Post-hospitalization from ___ Hospital] : Post-hospitalization from [unfilled] Hospital [Admitted on: ___] : The patient was admitted on [unfilled] [Discharged on ___] : discharged on [unfilled] [Patient Contacted By: ____] : and contacted by [unfilled]

## 2025-06-01 NOTE — PHYSICAL EXAM
[No Acute Distress] : no acute distress [Normal Sclera/Conjunctiva] : normal sclera/conjunctiva [Normal Outer Ear/Nose] : the outer ears and nose were normal in appearance [No JVD] : no jugular venous distention [No Respiratory Distress] : no respiratory distress  [Normal Rate] : normal rate  [No Edema] : there was no peripheral edema [Soft] : abdomen soft [No Rash] : no rash [Normal Gait] : normal gait [Coordination Grossly Intact] : coordination grossly intact [No Focal Deficits] : no focal deficits [Normal Affect] : the affect was normal [Normal Insight/Judgement] : insight and judgment were intact [de-identified] : TORI ma

## 2025-06-11 NOTE — ASSESSMENT
[FreeTextEntry1] : 67 y/o female with COPD, with limited stage small cell lung cancer RUL, negative mediastinal node sampling on navigational bronchoscopy. Diagnosed in 2024.  Not a surgical candidate due to poor PFT's, else her performance status is quite good.  11/1/24 - Started on chemotherapy with CDDP/Etoposide.  11/18/24 - Started XRT prior to C#2.    Completed 4 cycles of Etoposide/ cisplatin on 12/31/25 Completed RT to R lung 1/7/25   CT chest 2/10/25 showed good response to tx Plan for  durvalumab [id number] mg q28 days x one year- started  Feb 18, 2025. Tolerating OK.  Recent hospitalization for PNA- s/p antibiotics. Improved. Plan to repat CT chest non contrast in early August.  Has follow up with [facility] in July She will be a candidate for prophylactic brain RT.   Exam in August with treatment.   Dr. Glenna Armijo (PCP) Dr. Katie Jauregui (Pulm - [place]- [phone])

## 2025-06-11 NOTE — HISTORY OF PRESENT ILLNESS
[Disease: _____________________] : Disease: [unfilled] [T: ___] : T[unfilled] [AJCC Stage: ____] : AJCC Stage: [unfilled] [de-identified] : WILBER SORTO is a 68 y.o. F, current smoker (hx 2 PPD x 20 yrs, now cut down to 6-7 cigs/day few yrs), with a PMH significant for HTN and COPD, who we are following for a small cell lung cancer.  She presented with enlarging pulmonary nodules. Chronic mild cough and SOB on exertion - no significant change. Follows with pulmonologist Dr Katie Jauregui ( [place]- [phone]).  No weight loss. Overall feels OK - "as usual".  8/13/24 - CT Chest ([facility]) - Enlarging 3.9 x 2.9. 2.6 cm RUL lung mass and new 5 x 3.6 x 3.6 cm mass like density in RUL anteromedially. 9/5/24 - Navigational bronchoscopy - Path: RUL Cryo biopsy and RUL BAL positive for malignant cells - Small cell carcinoma. Path of Level 7, 4R, 11R negative for malignant cells. No epithelial lesion identified.  10/5/24 - PET/CT - 4.8 x 2.9 x approximately 7.5 cm in vertical span RUL mass with necrotic component, SUV 18.3, c/w recent biopsy-proven malignancy. There are additional FDG avid opacities in the anteromedial aspect of the RUL measuring 3.6 x 3.0 cm with up to SUV 9.6. These opacities are also present on the most recent CT chest measuring 4.5 x 2.5 cm and 7.9 cm in vertical span.  Nonavid 2.0 cm left adrenal nodule. 10/14/24 - Brain MRI - No evidence of metastases. No evidence of acute infarct, or hemorrhage.   PFTs on 4/16/24 showed FEV 1 0.78 L and DLCO 46 % predicted. She does not use home O2 but was not cleared for surgery.  11/1/24 - Started CDDP/Etoposide.  Completed cycle 4 on 12/31/24 11/18/24 - Started XRT prior to C#2. Completed RT to the Right lung with 6600 cGy in 33 fractions, finished on 1/7/25.  CT chest 2/10/25 [place] interval marked improvement of the right upper lobe nodules and opacities since October 5, 2024.  2/18/25 started IO consolidation durvalumab  1500 mg q 28 days x one year   March 2025- c/o cough   3/12/25 - CT Angio - No pulmonary embolism. No new findings to suggest pneumonitis. Overall increased mucous plugging of the lingular and left lower lobe segmental and subsegmental bronchi compared to February 2025. Improving patchy opacities in the posterior LLL. Stable RUL nodules and associated interlobular septal thickening. Seen by pulmonary  Dr. Katie Jauregui ([place]- [phone]). Plan for follow up CT chest in end of May.  MAy 14-16, 2025 admitted to  with fever, cough CT with PNA s/p cefepime, doxy- improved.  Follows with pulmonologist     [de-identified] : Small cell [de-identified] : Feels better Cough improved, no fevers. Saw her pulmonologist recently. Plan t repeat CT chest but she is not sure when.  [de-identified] : PD-L1 0%

## 2025-06-11 NOTE — PHYSICAL EXAM
[de-identified] : looks well  [Normal] : affect appropriate [de-identified] : few sporadic wheezes

## 2025-06-11 NOTE — REASON FOR VISIT
[Follow-Up Visit] : a follow-up [FreeTextEntry2] : limited stage  small cell lung cancer   in immunotherapy

## 2025-07-14 NOTE — DISEASE MANAGEMENT
[Clinical] : TNM Stage: c [FreeTextEntry4] : limited stage [TTNM] : 2 [NTNM] : 0 [MTNM] : 0 [II] : II

## 2025-07-14 NOTE — HISTORY OF PRESENT ILLNESS
[FreeTextEntry1] : The patient is a pleasant 68 year old female diagnosed with limited stage small cell lung cancer referred by Dr. Castrejon. She reports she was sent for a screening chest CT due to her smoking history ( 2 PPD x 20 yrs, now cut down to 2 cigs / day ). She also has COPD and chronic cough.  CT chest 8/13/24 ([facility]) showed enlarging 3.9 x 2.9. 2.6 cm RUL lung mass and new 5x 3.6 x 3.6 cm mass like density in RUL anteromedially.  Navigational bronchoscopy on 09/05/2024. Path of RUL biopsy revealed positive for malignant cells c/w small cell carcinoma. Path of Level 7, 4R, 11R revealed negative for malignant cells. No epithelial lesion identified.  CT brain w/o contrast 10/4/24 showed:  *  No evidence of acute intracranial hemorrhage, midline shift or CT evidence of acute territorial infarct. No area of abnormal enhancement. *  2.3 cm calcified lesion in the scalp at the level of the vertex may represent a sebaceous cyst. Clinical correlation suggested.  PET/CT ([facility]) 10/5/24 FDG avid lobulated right upper lobe mass with necrotic component measures 4.8 x 2.9 cm and approximately 7.5 cm in vertical span with photopenic component and thick rind of activity showing SUV 18.3 compatible with recently biopsy-proven malignancy. There are additional FDG avid opacities in the anteromedial aspect of the right upper lobe (image 85) measuring 3.6 x 3.0 cm with up to SUV 9.6. These opacities are also present on the most recent CT chest measuring 4.5 x 2.5 cm and 7.9 cm in vertical span. IMPRESSION : Right upper lobe FDG avid lobulated mass with intense activity compatible with biopsy-proven malignancy. Nonavid 2.0 cm left adrenal nodule.  Brain MRI 10/4/24 No evidence of metastases. No evidence of acute infarct, or hemorrhage.   There is no evidence of abnormal enhancement. Scattered T2/FLAIR hyperintense signal in the periventricular and pontine white matter, suggestive of mild to moderate chronic microvascular ischemic change.  She lives in  [place] with her . She has chronic mild cough and SOB on exertion- due to COPD, no significant change. Follows with pulmonologist Dr Katie Jauregui.  PFTs on 4/16/24 showed FEV 1 0.78 L and DLCO 46 % predicted. She does not use home O2 but was not cleared for surgery.  She began cycle 1 of chemotherapy last week with Cisplatin/Etopside on 11/1/24 and presents with her friend to discuss the role of radiation therapy in her care.  11/19/24 Patient seen for OTV fx 2/33. Feels well. 2nd cycle chemo this week.  11/26/24. Patient presents for OTV fx 8/33. Feels well. No fatigue. Upset about alopecia.   12/4/24 Patient seen for OTV, fx 11/33. Fatigue and bothered by alopecia.  12/10/24 Patient seen for OTV, fx 15/33. Feels well. NO complaints.  12/17/24 Patient is seen for OTV, Fx 20/33. Only complaint is constipation. Tried stool softener without success. Advised to take Miralax daily.  12/24/24 Patient presents for OTV, fx 25/33  Complains of  pruritic rash on upper anterior chest.  Otherwise well.  12/31/24 Patient is seen for OTV, fx 29/33. Reports cough at night. Using rescue inhaler. Chemo today. I prescribed Tessalon pearls to use one at bedtime.  Patient completed RT to the Right lung with 6600 cGy in 33 fractions, finished on 1/7/25.  2/6/25 Patient presents for 1 month PTE. She states she is feeling well overall with periods of fatigue; she does not go out too much, she is not happy about her hair loss and would like to see it coming back, she does not like to wear a wig. Cough is occasional, SOB only on exertion and no new symptoms to report. She did experience pruritic rash on her chest towards the end of RT but now resolved. She denies any neurological changes, headaches or memory problems. Her CT chest is scheduled for 2/10/25 with [place], ordered by Dr. Castrejon. She is to start Immunotherapy with If CT results are stable / improved- start immunotherapy consolidation -Durvalumab [id number] mg q28 days x one year. Scheduled to start on 2/19/25.  7/14/25. Patient presents for 6 months f/u. On 2/10/25 CT chest showed interval marked improvement of the right upper lobe nodules and opacities since October 5, 2024. On 2/18/25 she started IO consolidation durvalumab 1500 mg q 28 days x one year. 3/12/25 - CT Angio - No pulmonary embolism. No new findings to suggest pneumonitis. Overall increased mucous plugging of the lingular and left lower lobe segmental and subsegmental bronchi compared to February 2025. Improving patchy opacities in the posterior LLL. Stable RUL nodules and associated interlobular septal thickening. Seen by pulmonary Dr. Katie Jauregui ([place]- [phone]). Plan for follow up CT chest in end of May. MAy 14-16, 2025 admitted to  with fever, cough CT with PNA s/p cefepime, doxy- improved. Follows with pulmonologist. Plan to repat CT chest non contrast in early August. She might be a candidate for prophylactic brain radiation per consultation with Dr. Castrejon.